# Patient Record
Sex: FEMALE | Race: WHITE | Employment: OTHER | URBAN - NONMETROPOLITAN AREA
[De-identification: names, ages, dates, MRNs, and addresses within clinical notes are randomized per-mention and may not be internally consistent; named-entity substitution may affect disease eponyms.]

---

## 2020-01-20 ENCOUNTER — OFFICE VISIT (OUTPATIENT)
Dept: PRIMARY CARE CLINIC | Age: 20
End: 2020-01-20
Payer: MEDICAID

## 2020-01-20 VITALS
HEIGHT: 64 IN | DIASTOLIC BLOOD PRESSURE: 80 MMHG | WEIGHT: 192 LBS | BODY MASS INDEX: 32.78 KG/M2 | OXYGEN SATURATION: 98 % | SYSTOLIC BLOOD PRESSURE: 110 MMHG | TEMPERATURE: 97.2 F | HEART RATE: 91 BPM | RESPIRATION RATE: 18 BRPM

## 2020-01-20 PROCEDURE — 99203 OFFICE O/P NEW LOW 30 MIN: CPT | Performed by: FAMILY MEDICINE

## 2020-01-20 PROCEDURE — G8417 CALC BMI ABV UP PARAM F/U: HCPCS | Performed by: FAMILY MEDICINE

## 2020-01-20 PROCEDURE — G8427 DOCREV CUR MEDS BY ELIG CLIN: HCPCS | Performed by: FAMILY MEDICINE

## 2020-01-20 PROCEDURE — G8484 FLU IMMUNIZE NO ADMIN: HCPCS | Performed by: FAMILY MEDICINE

## 2020-01-20 PROCEDURE — 4004F PT TOBACCO SCREEN RCVD TLK: CPT | Performed by: FAMILY MEDICINE

## 2020-01-20 RX ORDER — LANOLIN ALCOHOL/MO/W.PET/CERES
3 CREAM (GRAM) TOPICAL NIGHTLY PRN
Qty: 30 TABLET | Refills: 3 | Status: SHIPPED | OUTPATIENT
Start: 2020-01-20 | End: 2020-08-18

## 2020-01-20 RX ORDER — ESCITALOPRAM OXALATE 10 MG/1
10 TABLET ORAL DAILY
Qty: 30 TABLET | Refills: 3 | Status: SHIPPED | OUTPATIENT
Start: 2020-01-20 | End: 2020-02-17 | Stop reason: SDUPTHER

## 2020-01-20 RX ORDER — NORELGESTROMIN AND ETHINYL ESTRADIOL 150; 35 UG/D; UG/D
PATCH TRANSDERMAL
COMMUNITY
Start: 2020-01-16 | End: 2021-07-30

## 2020-01-20 SDOH — HEALTH STABILITY: MENTAL HEALTH: HOW OFTEN DO YOU HAVE A DRINK CONTAINING ALCOHOL?: 2-4 TIMES A MONTH

## 2020-01-20 SDOH — HEALTH STABILITY: MENTAL HEALTH: HOW MANY STANDARD DRINKS CONTAINING ALCOHOL DO YOU HAVE ON A TYPICAL DAY?: 1 OR 2

## 2020-01-20 ASSESSMENT — PATIENT HEALTH QUESTIONNAIRE - PHQ9
7. TROUBLE CONCENTRATING ON THINGS, SUCH AS READING THE NEWSPAPER OR WATCHING TELEVISION: 1
4. FEELING TIRED OR HAVING LITTLE ENERGY: 3
2. FEELING DOWN, DEPRESSED OR HOPELESS: 3
3. TROUBLE FALLING OR STAYING ASLEEP: 3
1. LITTLE INTEREST OR PLEASURE IN DOING THINGS: 1
SUM OF ALL RESPONSES TO PHQ QUESTIONS 1-9: 15
8. MOVING OR SPEAKING SO SLOWLY THAT OTHER PEOPLE COULD HAVE NOTICED. OR THE OPPOSITE, BEING SO FIGETY OR RESTLESS THAT YOU HAVE BEEN MOVING AROUND A LOT MORE THAN USUAL: 0
10. IF YOU CHECKED OFF ANY PROBLEMS, HOW DIFFICULT HAVE THESE PROBLEMS MADE IT FOR YOU TO DO YOUR WORK, TAKE CARE OF THINGS AT HOME, OR GET ALONG WITH OTHER PEOPLE: 2
SUM OF ALL RESPONSES TO PHQ QUESTIONS 1-9: 15
5. POOR APPETITE OR OVEREATING: 3
9. THOUGHTS THAT YOU WOULD BE BETTER OFF DEAD, OR OF HURTING YOURSELF: 0
6. FEELING BAD ABOUT YOURSELF - OR THAT YOU ARE A FAILURE OR HAVE LET YOURSELF OR YOUR FAMILY DOWN: 1
SUM OF ALL RESPONSES TO PHQ9 QUESTIONS 1 & 2: 4

## 2020-01-24 ASSESSMENT — ENCOUNTER SYMPTOMS
COLOR CHANGE: 0
WHEEZING: 0
VOMITING: 0
BACK PAIN: 0
NAUSEA: 0
DIARRHEA: 0
COUGH: 0
ABDOMINAL PAIN: 0
EYE DISCHARGE: 0

## 2020-02-17 ENCOUNTER — OFFICE VISIT (OUTPATIENT)
Dept: PRIMARY CARE CLINIC | Age: 20
End: 2020-02-17
Payer: MEDICAID

## 2020-02-17 VITALS
HEART RATE: 107 BPM | DIASTOLIC BLOOD PRESSURE: 80 MMHG | TEMPERATURE: 97.3 F | BODY MASS INDEX: 32.44 KG/M2 | RESPIRATION RATE: 20 BRPM | OXYGEN SATURATION: 98 % | WEIGHT: 190 LBS | HEIGHT: 64 IN | SYSTOLIC BLOOD PRESSURE: 124 MMHG

## 2020-02-17 PROCEDURE — G8484 FLU IMMUNIZE NO ADMIN: HCPCS | Performed by: FAMILY MEDICINE

## 2020-02-17 PROCEDURE — G8427 DOCREV CUR MEDS BY ELIG CLIN: HCPCS | Performed by: FAMILY MEDICINE

## 2020-02-17 PROCEDURE — G8417 CALC BMI ABV UP PARAM F/U: HCPCS | Performed by: FAMILY MEDICINE

## 2020-02-17 PROCEDURE — 99213 OFFICE O/P EST LOW 20 MIN: CPT | Performed by: FAMILY MEDICINE

## 2020-02-17 PROCEDURE — 4004F PT TOBACCO SCREEN RCVD TLK: CPT | Performed by: FAMILY MEDICINE

## 2020-02-17 RX ORDER — ONDANSETRON 4 MG/1
4 TABLET, ORALLY DISINTEGRATING ORAL 3 TIMES DAILY PRN
Qty: 21 TABLET | Refills: 0 | Status: SHIPPED | OUTPATIENT
Start: 2020-02-17 | End: 2020-09-22

## 2020-02-17 RX ORDER — PROMETHAZINE HYDROCHLORIDE 25 MG/1
25 TABLET ORAL 4 TIMES DAILY PRN
Qty: 20 TABLET | Refills: 0 | Status: SHIPPED | OUTPATIENT
Start: 2020-02-17 | End: 2020-02-24

## 2020-02-17 RX ORDER — ESCITALOPRAM OXALATE 20 MG/1
20 TABLET ORAL DAILY
Qty: 30 TABLET | Refills: 5 | Status: SHIPPED | OUTPATIENT
Start: 2020-02-17 | End: 2020-08-18

## 2020-02-17 RX ORDER — SCOLOPAMINE TRANSDERMAL SYSTEM 1 MG/1
1 PATCH, EXTENDED RELEASE TRANSDERMAL
Qty: 10 PATCH | Refills: 11 | Status: SHIPPED | OUTPATIENT
Start: 2020-02-17 | End: 2020-08-18

## 2020-02-18 ASSESSMENT — ENCOUNTER SYMPTOMS
EYE DISCHARGE: 0
VOMITING: 0
NAUSEA: 0
COUGH: 0
ABDOMINAL PAIN: 0
COLOR CHANGE: 0
BACK PAIN: 0
WHEEZING: 0
DIARRHEA: 0

## 2020-08-18 ENCOUNTER — OFFICE VISIT (OUTPATIENT)
Dept: PRIMARY CARE CLINIC | Age: 20
End: 2020-08-18
Payer: MEDICAID

## 2020-08-18 VITALS
SYSTOLIC BLOOD PRESSURE: 120 MMHG | RESPIRATION RATE: 16 BRPM | TEMPERATURE: 97.2 F | HEIGHT: 63 IN | WEIGHT: 174.6 LBS | BODY MASS INDEX: 30.94 KG/M2 | DIASTOLIC BLOOD PRESSURE: 78 MMHG | OXYGEN SATURATION: 98 % | HEART RATE: 95 BPM

## 2020-08-18 LAB
ALBUMIN SERPL-MCNC: 4.3 G/DL (ref 3.5–5.2)
ALP BLD-CCNC: 62 U/L (ref 35–104)
ALT SERPL-CCNC: 15 U/L (ref 5–33)
ANION GAP SERPL CALCULATED.3IONS-SCNC: 18 MMOL/L (ref 7–19)
AST SERPL-CCNC: 14 U/L (ref 5–32)
BASOPHILS ABSOLUTE: 0.1 K/UL (ref 0–0.2)
BASOPHILS RELATIVE PERCENT: 0.9 % (ref 0–1)
BILIRUB SERPL-MCNC: 0.3 MG/DL (ref 0.2–1.2)
BUN BLDV-MCNC: 11 MG/DL (ref 6–20)
CALCIUM SERPL-MCNC: 9.6 MG/DL (ref 8.6–10)
CHLORIDE BLD-SCNC: 103 MMOL/L (ref 98–111)
CO2: 19 MMOL/L (ref 22–29)
CREAT SERPL-MCNC: 0.9 MG/DL (ref 0.5–0.9)
EOSINOPHILS ABSOLUTE: 0.1 K/UL (ref 0–0.6)
EOSINOPHILS RELATIVE PERCENT: 2 % (ref 0–5)
GFR AFRICAN AMERICAN: >59
GFR NON-AFRICAN AMERICAN: >60
GLUCOSE BLD-MCNC: 92 MG/DL (ref 74–109)
HCT VFR BLD CALC: 43.1 % (ref 37–47)
HEMOGLOBIN: 14.2 G/DL (ref 12–16)
IMMATURE GRANULOCYTES #: 0 K/UL
LYMPHOCYTES ABSOLUTE: 1.5 K/UL (ref 1.1–4.5)
LYMPHOCYTES RELATIVE PERCENT: 21.1 % (ref 20–40)
MCH RBC QN AUTO: 28 PG (ref 27–31)
MCHC RBC AUTO-ENTMCNC: 32.9 G/DL (ref 33–37)
MCV RBC AUTO: 84.8 FL (ref 81–99)
MONOCYTES ABSOLUTE: 0.5 K/UL (ref 0–0.9)
MONOCYTES RELATIVE PERCENT: 6.6 % (ref 0–10)
NEUTROPHILS ABSOLUTE: 4.8 K/UL (ref 1.5–7.5)
NEUTROPHILS RELATIVE PERCENT: 69.1 % (ref 50–65)
PDW BLD-RTO: 12.9 % (ref 11.5–14.5)
PLATELET # BLD: 267 K/UL (ref 130–400)
PMV BLD AUTO: 11.1 FL (ref 9.4–12.3)
POTASSIUM SERPL-SCNC: 4.2 MMOL/L (ref 3.5–5)
RBC # BLD: 5.08 M/UL (ref 4.2–5.4)
SODIUM BLD-SCNC: 140 MMOL/L (ref 136–145)
TOTAL PROTEIN: 7.2 G/DL (ref 6.6–8.7)
TSH SERPL DL<=0.05 MIU/L-ACNC: 1.45 UIU/ML (ref 0.27–4.2)
WBC # BLD: 6.9 K/UL (ref 4.8–10.8)

## 2020-08-18 PROCEDURE — G8427 DOCREV CUR MEDS BY ELIG CLIN: HCPCS | Performed by: FAMILY MEDICINE

## 2020-08-18 PROCEDURE — 4004F PT TOBACCO SCREEN RCVD TLK: CPT | Performed by: FAMILY MEDICINE

## 2020-08-18 PROCEDURE — 93000 ELECTROCARDIOGRAM COMPLETE: CPT | Performed by: FAMILY MEDICINE

## 2020-08-18 PROCEDURE — G8417 CALC BMI ABV UP PARAM F/U: HCPCS | Performed by: FAMILY MEDICINE

## 2020-08-18 PROCEDURE — 99214 OFFICE O/P EST MOD 30 MIN: CPT | Performed by: FAMILY MEDICINE

## 2020-08-18 PROCEDURE — 36415 COLL VENOUS BLD VENIPUNCTURE: CPT | Performed by: FAMILY MEDICINE

## 2020-08-18 RX ORDER — BUSPIRONE HYDROCHLORIDE 5 MG/1
5 TABLET ORAL 3 TIMES DAILY
Qty: 90 TABLET | Refills: 0 | Status: SHIPPED | OUTPATIENT
Start: 2020-08-18 | End: 2020-09-22 | Stop reason: SDUPTHER

## 2020-08-18 RX ORDER — BUPROPION HYDROCHLORIDE 150 MG/1
150 TABLET ORAL EVERY MORNING
Qty: 30 TABLET | Refills: 3 | Status: SHIPPED | OUTPATIENT
Start: 2020-08-18 | End: 2020-09-22 | Stop reason: SDUPTHER

## 2020-08-18 NOTE — PROGRESS NOTES
SUBJECTIVE:    Patient ID: [place] is a 23 y.o. female. HPI:   Patient is here today for complaints of problems with being lightheaded, headache, vomiting, chest pain - left side of chest pain, no triggers. HR has gotten to 40 and then highest is 195. No family history of heart problems. Feels different than a panic attack. She states that her heart rate fluctuates a lot. She states that her apple watch will go off and she states it occasionally will run in the 190s and then will drop down in the 40s. She states that she does not really notice any specific triggers for this. She states that she does notice that it drops down a lot when she is resting. She is feeling palpitations when this happens. She states that it does cause some dizziness as well. She states her anxiety is much worse. She states that she is also having some panic attacks. She states that she has stopped taking her Lexapro because she actually felt like this was making her worse. She denies any suicidal thoughts or ideation. She is having difficulty sleeping. She states that she does have the panic attacks a lot at night. Past Medical History:   Diagnosis Date    Anxiety     Depression     PTSD (post-traumatic stress disorder)       Current Outpatient Medications   Medication Sig Dispense Refill    buPROPion (WELLBUTRIN XL) 150 MG extended release tablet Take 1 tablet by mouth every morning 30 tablet 3    busPIRone (BUSPAR) 5 MG tablet Take 1 tablet by mouth 3 times daily 90 tablet 0    ondansetron (ZOFRAN-ODT) 4 MG disintegrating tablet Take 1 tablet by mouth 3 times daily as needed for Nausea or Vomiting 21 tablet 0    XULANE 150-35 MCG/24HR APPLY 1 PATCH TOPICALLY ONCE A WEEK       No current facility-administered medications for this visit. Allergies   Allergen Reactions    Penicillins Hives       Review of Systems   Constitutional: Positive for fatigue.  Negative for activity change, appetite

## 2020-08-20 ASSESSMENT — ENCOUNTER SYMPTOMS
COUGH: 0
EYE DISCHARGE: 0
DIARRHEA: 0
BACK PAIN: 0
ABDOMINAL PAIN: 0
CHEST TIGHTNESS: 1
NAUSEA: 1
WHEEZING: 0
COLOR CHANGE: 0
VOMITING: 0

## 2020-08-21 ENCOUNTER — HOSPITAL ENCOUNTER (OUTPATIENT)
Dept: NON INVASIVE DIAGNOSTICS | Age: 20
Discharge: HOME | End: 2020-08-21
Payer: MEDICAID

## 2020-08-21 PROCEDURE — 93229 REMOTE 30 DAY ECG TECH SUPP: CPT

## 2020-09-15 NOTE — PROCEDURES
Cardiac event monitor report    Dates of recording 8/21/2020 through 8/29/2020    Summary impressions:    1. Sinus rhythm minimal heart rate 51 average rate 86 maximal rate 174      2. No episodes of ventricular tachycardia were recorded    3. No pauses greater than 3.0 seconds were recorded    4. No episodes of complete or Mobitz 2 atrioventricular block were recorded    5. No episodes of atrial fibrillation were recorded    6. No episodes of supraventricular tachycardia were recorded    7. 5 triggered events 0 diary events sinus rhythm during those recordings    8.  Rare ectopy otherwise noted

## 2020-09-22 ENCOUNTER — OFFICE VISIT (OUTPATIENT)
Dept: PRIMARY CARE CLINIC | Age: 20
End: 2020-09-22
Payer: MEDICAID

## 2020-09-22 VITALS
HEIGHT: 63 IN | DIASTOLIC BLOOD PRESSURE: 74 MMHG | BODY MASS INDEX: 30.09 KG/M2 | SYSTOLIC BLOOD PRESSURE: 121 MMHG | OXYGEN SATURATION: 98 % | WEIGHT: 169.8 LBS | HEART RATE: 98 BPM | RESPIRATION RATE: 18 BRPM | TEMPERATURE: 97.9 F

## 2020-09-22 PROCEDURE — G8417 CALC BMI ABV UP PARAM F/U: HCPCS | Performed by: FAMILY MEDICINE

## 2020-09-22 PROCEDURE — 99214 OFFICE O/P EST MOD 30 MIN: CPT | Performed by: FAMILY MEDICINE

## 2020-09-22 PROCEDURE — G8427 DOCREV CUR MEDS BY ELIG CLIN: HCPCS | Performed by: FAMILY MEDICINE

## 2020-09-22 PROCEDURE — 4004F PT TOBACCO SCREEN RCVD TLK: CPT | Performed by: FAMILY MEDICINE

## 2020-09-22 RX ORDER — BUSPIRONE HYDROCHLORIDE 10 MG/1
10 TABLET ORAL 3 TIMES DAILY PRN
Qty: 90 TABLET | Refills: 3 | Status: SHIPPED | OUTPATIENT
Start: 2020-09-22 | End: 2021-07-30

## 2020-09-22 RX ORDER — BUPROPION HYDROCHLORIDE 300 MG/1
300 TABLET ORAL EVERY MORNING
Qty: 30 TABLET | Refills: 5 | Status: SHIPPED | OUTPATIENT
Start: 2020-09-22 | End: 2021-07-30

## 2020-09-22 NOTE — PATIENT INSTRUCTIONS
Patient Education        Hip Flexor Strain: Rehab Exercises  Introduction  Here are some examples of exercises for you to try. The exercises may be suggested for a condition or for rehabilitation. Start each exercise slowly. Ease off the exercises if you start to have pain. You will be told when to start these exercises and which ones will work best for you. How to do the exercises  Pelvic tilt with marching   1. Lie on your back with your knees bent and your feet flat on the floor. 2. Tighten your belly muscles and buttocks, and press your lower back to the floor. 3. Keeping your knees bent, lift and then lower one leg up off the floor, and then lift and lower your other leg like you are marching. Each time you lift your leg, hold that position for about 6 seconds before lowering your leg. 4. Repeat 8 to 12 times. Scissors   1. Lie on your back with your knees bent at a 90-degree angle and your feet off the floor. 2. Tighten your belly muscles and buttocks, and press your lower back to the floor. 3. Slowly straighten one leg, and hold that position for about 6 seconds. Your leg should be about 12 inches off the floor. Bring that leg back to the starting position, and then straighten your other leg. Hold that position for about 6 seconds, and then switch legs again. 4. Repeat 8 to 12 times. Hamstring stretch (lying down)   1. Lie flat on your back with your legs straight. If you feel discomfort in your back, place a small towel roll under your lower back. 2. Holding the back of your affected leg for support, lift your leg straight up and toward your body until you feel a stretch at the back of your thigh. 3. Hold the stretch for at least 30 seconds. 4. Repeat 2 to 4 times. Quadricep and hip flexor stretch (lying on side)   1. Lie on your side with your good leg flat on the floor and your hand supporting your head.   2. Bend your top leg, and reach behind you to grab the front of that foot or ankle with your other hand. 3. Stretch your leg back by pulling your foot toward your buttock. You will feel the stretch in the front of your thigh. If this causes stress on your knee, do not do this stretch. 4. Hold the stretch for at least 15 to 30 seconds. 5. Repeat 2 to 4 times. Hip flexor stretch (kneeling)   1. Kneel on your affected leg and bend your good leg out in front of you, with that foot flat on the floor. If you feel discomfort in the front of your knee, place a towel under your knee. 2. Keeping your back straight, slowly push your hips forward until you feel a stretch in the upper thigh of your back leg and hip. 3. Hold the stretch for at least 15 to 30 seconds. 4. Repeat 2 to 4 times. Hip flexor stretch (edge of table)   1. Lie flat on your back on a table or flat bench, with your knees and lower legs hanging off the edge of the table. 2. Grab your good leg at the knee, and pull that knee back toward your chest. Relax your affected leg and let it hang down toward the floor until you feel a stretch in the upper thigh of your affected leg and hip. 3. Hold the stretch for at least 15 to 30 seconds. 4. Repeat 2 to 4 times. Follow-up care is a key part of your treatment and safety. Be sure to make and go to all appointments, and call your doctor if you are having problems. It's also a good idea to know your test results and keep a list of the medicines you take. Where can you learn more? Go to https://[email or web address]. org and sign in to your [employer] account. Enter L439 in the [employer] box to learn more about \"Hip Flexor Strain: Rehab Exercises. \"     If you do not have an account, please click on the \"Sign Up Now\" link. Current as of: March 2, 2020               Content Version: 12.5  © 2006-2020 [employer]. Care instructions adapted under license by [facility] [place] ([facility]).  If you have questions about a medical condition or this instruction, always ask your healthcare professional. [place] [address] any warranty or liability for your use of this information.

## 2020-09-24 ASSESSMENT — ENCOUNTER SYMPTOMS
EYE DISCHARGE: 0
ABDOMINAL PAIN: 0
COLOR CHANGE: 0
NAUSEA: 0
VOMITING: 0
COUGH: 0
DIARRHEA: 0
WHEEZING: 0
BACK PAIN: 0

## 2020-09-24 NOTE — PROGRESS NOTES
Negative for cough and wheezing. Cardiovascular: Negative for chest pain and leg swelling. Gastrointestinal: Negative for abdominal pain, diarrhea, nausea and vomiting. Genitourinary: Negative for difficulty urinating, frequency and urgency. Musculoskeletal: Positive for myalgias. Negative for arthralgias, back pain and gait problem. Skin: Negative for color change and rash. Neurological: Negative for dizziness and headaches. Hematological: Does not bruise/bleed easily. Psychiatric/Behavioral: Positive for dysphoric mood. Negative for sleep disturbance and suicidal ideas. The patient is nervous/anxious. OBJECTIVE:     Physical Exam  Vitals signs reviewed. Constitutional:       General: She is not in acute distress. Appearance: Normal appearance. She is well-developed. She is not diaphoretic. HENT:      Head: Normocephalic and atraumatic. Right Ear: External ear normal.      Left Ear: External ear normal.   Neck:      Musculoskeletal: Normal range of motion and neck supple. Cardiovascular:      Rate and Rhythm: Normal rate and regular rhythm. Pulses: Normal pulses. Heart sounds: Normal heart sounds. No murmur. Pulmonary:      Effort: Pulmonary effort is normal. No respiratory distress. Breath sounds: Normal breath sounds. Skin:     General: Skin is warm and dry. Neurological:      General: No focal deficit present. Mental Status: She is alert and oriented to person, place, and time. Mental status is at baseline. Psychiatric:         Mood and Affect: Mood normal.         Behavior: Behavior normal.         Thought Content: Thought content normal.         Judgment: Judgment normal.        /74   Pulse 98   Temp 97.9 °F (36.6 °C)   Resp 18   Ht 5' 3\" (1.6 m)   Wt 169 lb 12.8 oz (77 kg)   SpO2 98%   BMI 30.08 kg/m²      ASSESSMENT:    Marie Coyne was seen today for 1 month follow-up.     Diagnoses and all orders for this visit:    Anxiety    Moderate

## 2021-07-30 ENCOUNTER — TELEPHONE (OUTPATIENT)
Dept: PRIMARY CARE CLINIC | Age: 21
End: 2021-07-30

## 2021-07-30 ENCOUNTER — OFFICE VISIT (OUTPATIENT)
Dept: PRIMARY CARE CLINIC | Age: 21
End: 2021-07-30
Payer: MEDICAID

## 2021-07-30 VITALS
SYSTOLIC BLOOD PRESSURE: 124 MMHG | BODY MASS INDEX: 33.59 KG/M2 | WEIGHT: 189.6 LBS | HEIGHT: 63 IN | OXYGEN SATURATION: 98 % | TEMPERATURE: 97.8 F | HEART RATE: 105 BPM | RESPIRATION RATE: 18 BRPM | DIASTOLIC BLOOD PRESSURE: 80 MMHG

## 2021-07-30 DIAGNOSIS — R21 RASH: Primary | ICD-10-CM

## 2021-07-30 DIAGNOSIS — F32.A ANXIETY AND DEPRESSION: ICD-10-CM

## 2021-07-30 DIAGNOSIS — F41.9 ANXIETY AND DEPRESSION: ICD-10-CM

## 2021-07-30 PROCEDURE — 99214 OFFICE O/P EST MOD 30 MIN: CPT | Performed by: NURSE PRACTITIONER

## 2021-07-30 PROCEDURE — G8427 DOCREV CUR MEDS BY ELIG CLIN: HCPCS | Performed by: NURSE PRACTITIONER

## 2021-07-30 PROCEDURE — 4004F PT TOBACCO SCREEN RCVD TLK: CPT | Performed by: NURSE PRACTITIONER

## 2021-07-30 PROCEDURE — G8417 CALC BMI ABV UP PARAM F/U: HCPCS | Performed by: NURSE PRACTITIONER

## 2021-07-30 RX ORDER — DIAPER,BRIEF,INFANT-TODD,DISP
EACH MISCELLANEOUS
Qty: 1 TUBE | Refills: 1 | Status: SHIPPED | OUTPATIENT
Start: 2021-07-30 | End: 2021-07-30 | Stop reason: SDUPTHER

## 2021-07-30 RX ORDER — DIAPER,BRIEF,INFANT-TODD,DISP
EACH MISCELLANEOUS
Qty: 1 TUBE | Refills: 1 | Status: SHIPPED | OUTPATIENT
Start: 2021-07-30 | End: 2021-08-06

## 2021-07-30 RX ORDER — HYDROXYZINE HYDROCHLORIDE 25 MG/1
25 TABLET, FILM COATED ORAL EVERY 8 HOURS PRN
Qty: 120 TABLET | Refills: 1 | Status: SHIPPED | OUTPATIENT
Start: 2021-07-30 | End: 2021-08-09

## 2021-07-30 RX ORDER — ERYTHROMYCIN AND BENZOYL PEROXIDE 30; 50 MG/G; MG/G
GEL TOPICAL
Qty: 46.6 G | Refills: 2 | Status: SHIPPED | OUTPATIENT
Start: 2021-07-30 | End: 2021-07-30 | Stop reason: SDUPTHER

## 2021-07-30 RX ORDER — ERYTHROMYCIN AND BENZOYL PEROXIDE 30; 50 MG/G; MG/G
GEL TOPICAL
Qty: 46.6 G | Refills: 2 | Status: SHIPPED | OUTPATIENT
Start: 2021-07-30 | End: 2021-08-29

## 2021-07-30 RX ORDER — CLOBETASOL PROPIONATE 0.5 MG/G
OINTMENT TOPICAL
Qty: 1 TUBE | Refills: 1 | Status: SHIPPED | OUTPATIENT
Start: 2021-07-30 | End: 2021-10-11

## 2021-07-30 RX ORDER — CLOBETASOL PROPIONATE 0.5 MG/G
OINTMENT TOPICAL
Qty: 1 TUBE | Refills: 1 | Status: SHIPPED | OUTPATIENT
Start: 2021-07-30 | End: 2021-07-30 | Stop reason: SDUPTHER

## 2021-07-30 ASSESSMENT — PATIENT HEALTH QUESTIONNAIRE - PHQ9
5. POOR APPETITE OR OVEREATING: 3
SUM OF ALL RESPONSES TO PHQ9 QUESTIONS 1 & 2: 4
3. TROUBLE FALLING OR STAYING ASLEEP: 3
7. TROUBLE CONCENTRATING ON THINGS, SUCH AS READING THE NEWSPAPER OR WATCHING TELEVISION: 0
10. IF YOU CHECKED OFF ANY PROBLEMS, HOW DIFFICULT HAVE THESE PROBLEMS MADE IT FOR YOU TO DO YOUR WORK, TAKE CARE OF THINGS AT HOME, OR GET ALONG WITH OTHER PEOPLE: 3
SUM OF ALL RESPONSES TO PHQ QUESTIONS 1-9: 19
6. FEELING BAD ABOUT YOURSELF - OR THAT YOU ARE A FAILURE OR HAVE LET YOURSELF OR YOUR FAMILY DOWN: 3
9. THOUGHTS THAT YOU WOULD BE BETTER OFF DEAD, OR OF HURTING YOURSELF: 0
SUM OF ALL RESPONSES TO PHQ QUESTIONS 1-9: 19
1. LITTLE INTEREST OR PLEASURE IN DOING THINGS: 2
2. FEELING DOWN, DEPRESSED OR HOPELESS: 2
8. MOVING OR SPEAKING SO SLOWLY THAT OTHER PEOPLE COULD HAVE NOTICED. OR THE OPPOSITE, BEING SO FIGETY OR RESTLESS THAT YOU HAVE BEEN MOVING AROUND A LOT MORE THAN USUAL: 3
4. FEELING TIRED OR HAVING LITTLE ENERGY: 3
SUM OF ALL RESPONSES TO PHQ QUESTIONS 1-9: 19

## 2021-07-30 ASSESSMENT — ENCOUNTER SYMPTOMS
RESPIRATORY NEGATIVE: 1
ALLERGIC/IMMUNOLOGIC NEGATIVE: 1
EYES NEGATIVE: 1
GASTROINTESTINAL NEGATIVE: 1

## 2021-07-30 ASSESSMENT — COLUMBIA-SUICIDE SEVERITY RATING SCALE - C-SSRS
6. HAVE YOU EVER DONE ANYTHING, STARTED TO DO ANYTHING, OR PREPARED TO DO ANYTHING TO END YOUR LIFE?: NO
2. HAVE YOU ACTUALLY HAD ANY THOUGHTS OF KILLING YOURSELF?: NO
1. WITHIN THE PAST MONTH, HAVE YOU WISHED YOU WERE DEAD OR WISHED YOU COULD GO TO SLEEP AND NOT WAKE UP?: NO

## 2021-07-30 NOTE — PATIENT INSTRUCTIONS
Patient Education        benzoyl peroxide topical  Pronunciation:  GREG zoyl per OX rosa isela  Brand:  Acne-Clear, Benzac AC, BenzePrO, Benziq, Brevoxyl Acne Wash Kit, Clearskin, Fostex Wash 10%, NeoBenz Micro, Neutrogena Acne Mask, Oscion, Oxy Daily Wash, Oxy-10, Pacnex, PanOxyl, Persa-Gel, Riax, SoluCLENZ Rx, Jesus Manuel Wheatley  What is the most important information I should know about benzoyl peroxide topical?  Benzoyl peroxide can cause a rare but serious allergic reaction or severe skin irritation. Stop using this medicine and get emergency medical help if you have: hives, itching; difficult breathing, feeling light-headed; or swelling of your face, lips, tongue, or throat. What is benzoyl peroxide topical?  Benzoyl peroxide has an antibacterial effect. It also has a mild drying effect, which allows excess oils and dirt to be easily washed away from the skin. Benzoyl peroxide topical (for the skin) is used to treat acne. There are many brands and forms of benzoyl peroxide available. Not all brands are listed on this leaflet. Benzoyl peroxide topical may also be used for purposes not listed in this medication guide. What should I discuss with my healthcare provider before using benzoyl peroxide topical?  You should not use benzoyl peroxide if you are allergic to it, or if you have:  · very sensitive skin. Ask a doctor or pharmacist if this medicine is safe to use if you have any skin conditions or allergies. Ask a doctor before using this medicine if you are pregnant or breastfeeding. Do not use this medicine on a child without medical advice. How should I use benzoyl peroxide topical?  Benzoyl peroxide topical can cause a rare but serious allergic reaction or severe skin irritation. Before you start using this medicine, you may choose to apply a \"test dose\" to see if you have a reaction. Apply a very small amount of the medicine to 1 or 2 small acne areas every day for 3 days in a row.  If there is no reaction, begin the possible side effects of benzoyl peroxide topical?  Benzoyl peroxide topical can cause a rare but serious allergic reaction or severe skin irritation. These reactions may occur just a few minutes after you apply the medicine, or within a day or longer afterward. Stop using this medicine and get emergency medical help if you have signs of an allergic reaction: hives, itching; difficult breathing, feeling light-headed; swelling of your face, lips, tongue, or throat. Stop using benzoyl peroxide and call your doctor at once if you have any of these side effects on the treated skin:  · severe itching or burning;  · severe stinging or redness;  · swelling; or  · peeling. Common side effects may include:  · mild stinging or burning;  · itching or tingly feeling;  · skin dryness, peeling, or flaking; or  · redness or other irritation. This is not a complete list of side effects and others may occur. Call your doctor for medical advice about side effects. You may report side effects to FDA at 1-048-FDA-1366. What other drugs will affect benzoyl peroxide topical?  Applying benzoyl peroxide while you are also using tretinoin topical medicine may cause severe skin irritation. Brands that contain tretinoin include Avita, Renova, Retin-A, Tretin-X, and others. Medicine used on the skin is not likely to be affected by other drugs you use. But many drugs can interact with each other. Tell each of your health care providers about all medicines you use, including prescription and over-the-counter medicines, vitamins, and herbal products. Where can I get more information? Your pharmacist can provide more information about benzoyl peroxide topical.  Remember, keep this and all other medicines out of the reach of children, never share your medicines with others, and use this medication only for the indication prescribed.    Every effort has been made to ensure that the information provided by [address] is accurate, up-to-date, and complete, but no guarantee is made to that effect. Drug information contained herein may be time sensitive. [employer] information has been compiled for use by healthcare practitioners and consumers in the [place] and therefore [employer] does not warrant that uses outside of the [place] are appropriate, unless specifically indicated otherwise. [facility] drug information does not endorse drugs, diagnose patients or recommend therapy. [facility] drug information is an informational resource designed to assist licensed healthcare practitioners in caring for their patients and/or to serve consumers viewing this service as a supplement to, and not a substitute for, the expertise, skill, knowledge and judgment of healthcare practitioners. The absence of a warning for a given drug or drug combination in no way should be construed to indicate that the drug or drug combination is safe, effective or appropriate for any given patient. [facility] does not assume any responsibility for any aspect of healthcare administered with the aid of information [employer] provides. The information contained herein is not intended to cover all possible uses, directions, precautions, warnings, drug interactions, allergic reactions, or adverse effects. If you have questions about the drugs you are taking, check with your doctor, nurse or pharmacist.  Copyright 3514-9926 [address]: 10.03. Revision date: 3/15/2020. Care instructions adapted under license by [facility] ([facility]). If you have questions about a medical condition or this instruction, always ask your healthcare professional. [place] [address] any warranty or liability for your use of this information. Patient Education        bacitracin topical  Pronunciation:  BAS i TRAY sin TOP i peggy  Brand:  Baciguent  What is the most important information I should know about bacitracin?   Follow all directions on your medicine label and package. Tell each of your healthcare providers about all your medical conditions, allergies, and all medicines you use. What is bacitracin? Bacitracin is an antibiotic that fights bacteria. Bacitracin topical (for the skin) is used to prevent infection in minor cuts, scrapes, and burns. Bacitracin may also be used for purposes not listed in this medication guide. What should I discuss with my health care provider before using bacitracin? You should not use this medicine if you are allergic to bacitracin, mineral oil, or petroleum jelly. Do not use bacitracin topical to treat animal bites, puncture wounds, deep skin wounds, or severe burns. Ask a doctor or pharmacist if it is safe for you to use this medicine if you are not sure. Bacitracin is not expected to harm an unborn baby. It is not known whether bacitracin passes into breast milk or if it could harm a nursing baby. Ask a doctor or pharmacist if it is safe for you to use this medicine if you are pregnant or breast-feeding. How should I use bacitracin? Use exactly as directed on the label, or as prescribed by your doctor. Do not use in larger or smaller amounts or for longer than recommended. Clean the skin area before applying bacitracin topical.  You may apply bacitracin to the affected area 1 to 3 times per day. Apply only enough to cover the area you are treating. Do not use this medication over large areas of skin. You may cover the treated skin with a bandage. Do not use bacitracin for longer than 7 days in a row. Call your doctor if your symptoms do not improve after 7 days of treatment, or if you develop a skin rash. Store at room temperature away from moisture and heat. Do not freeze. What happens if I miss a dose? Since bacitracin topical is when needed, you are not likely to miss a dose. What happens if I overdose? Seek emergency medical attention or call the Poison Help line at 1-547.500.2307.   What should I avoid while using bacitracin? Bacitracin topical is for use only on the skin. Avoid getting this medication in your eyes, nose, mouth, rectum, or vagina. If this does happen, rinse with water. What are the possible side effects of bacitracin? Get emergency medical help if you have signs of an allergic reaction:  hives; difficult breathing; swelling of your face, lips, tongue, or throat. Less serious side effects are more likely, and you may have none at all. This is not a complete list of side effects and others may occur. Call your doctor for medical advice about side effects. You may report side effects to FDA at 2-750-CRZ-7323. What other drugs will affect bacitracin? It is not likely that other drugs you take orally or inject will have an effect on topically applied bacitracin. But many drugs can interact with each other. Tell each of your health care providers about all medicines you use, including prescription and over-the-counter medicines, vitamins, and herbal products. Where can I get more information? Your pharmacist can provide more information about bacitracin topical.  Remember, keep this and all other medicines out of the reach of children, never share your medicines with others, and use this medication only for the indication prescribed. Every effort has been made to ensure that the information provided by [address] is accurate, up-to-date, and complete, but no guarantee is made to that effect. Drug information contained herein may be time sensitive. [facility] information has been compiled for use by healthcare practitioners and consumers in the [place] and therefore [facility] does not warrant that uses outside of the [place] are appropriate, unless specifically indicated otherwise. [facility] drug information does not endorse drugs, diagnose patients or recommend therapy.  [facility] drug information is an informational resource designed to assist licensed healthcare practitioners in caring for their patients and/or to serve consumers viewing this service as a supplement to, and not a substitute for, the expertise, skill, knowledge and judgment of healthcare practitioners. The absence of a warning for a given drug or drug combination in no way should be construed to indicate that the drug or drug combination is safe, effective or appropriate for any given patient. [facility] does not assume any responsibility for any aspect of healthcare administered with the aid of information [facility] provides. The information contained herein is not intended to cover all possible uses, directions, precautions, warnings, drug interactions, allergic reactions, or adverse effects. If you have questions about the drugs you are taking, check with your doctor, nurse or pharmacist.  Copyright 4133-3879 [address] Version: 3.01. Revision date: 9/30/2015. Care instructions adapted under license by [facility] ([facility]). If you have questions about a medical condition or this instruction, always ask your healthcare professional. [place] [address] any warranty or liability for your use of this information. Patient Education        zinc oxide topical  Pronunciation:  LUKE natarajan  Brand:  ARC, Balmex, Radha Butt Paste, Caldesene, Calmol-4 Suppository, Critic-Aid Skin Paste, Dr. Noé Hunter Rash + Skin, [place] Buttocks Ointment, Isa-Protect, Pinxav, Rash Relief, Secura Protective Cream, Seniortopix Healix, Unna-Flex Elastic Unna Boot 4 inch, Z-Bum, Znlin  What is the most important information I should know about zinc oxide topical?  Follow all directions on your medicine label and package. Tell each of your healthcare providers about all your medical conditions, allergies, and all medicines you use.   What is zinc oxide topical?  Zinc oxide is a mineral.  Zinc oxide topical (for the skin) is used to treat diaper rash, minor burns, severely chapped skin, or other minor skin wrapper before inserting the suppository. Avoid handling the suppository too long or it will melt. Lie on your back with your knees up toward your chest. Gently insert the suppository into your rectum about 1 inch, pointed tip first.  Stay lying down for a few minutes while the suppository melts. You should feel little or no discomfort. Avoid using the bathroom for at least an hour. Call your doctor if your symptoms do not improve after 7 days of treatment. Store at room temperature away from moisture and heat. Keep the tube cap tightly closed when not in use. You may store zinc oxide rectal suppositories in a refrigerator to prevent melting. What happens if I miss a dose? Since this medicine is used when needed, you may not be on a dosing schedule. Skip any missed dose if it's almost time for your next dose. Do not use two doses at one time. What happens if I overdose? An overdose of zinc oxide is not expected to be dangerous. Seek emergency medical attention or call the Poison Help line at 1-436.160.3404 if anyone has accidentally swallowed the medication. What should I avoid while using zinc oxide topical?  Do not use zinc oxide topical on deep skin wounds or severe burns. Avoid using other medications on the areas you treat with zinc oxide unless your doctor tells you to. Rinse with water if this medicine gets in your eyes. What are the possible side effects of zinc oxide topical?  Get emergency medical help if you have signs of an allergic reaction: hives; difficult breathing; swelling of your face, lips, tongue, or throat. Stop using zinc oxide rectal suppositories and call your doctor if you have rectal bleeding or continued pain. This is not a complete list of side effects and others may occur. Call your doctor for medical advice about side effects. You may report side effects to FDA at 4-355-FDA-7317.   What other drugs will affect zinc oxide topical?  Medicine used on the skin is not likely to be affected by other drugs you use. But many drugs can interact with each other. Tell each of your healthcare providers about all medicines you use, including prescription and over-the-counter medicines, vitamins, and herbal products. Where can I get more information? Your pharmacist can provide more information about zinc oxide topical.  Remember, keep this and all other medicines out of the reach of children, never share your medicines with others, and use this medication only for the indication prescribed. Every effort has been made to ensure that the information provided by [address] is accurate, up-to-date, and complete, but no guarantee is made to that effect. Drug information contained herein may be time sensitive. [facility] information has been compiled for use by healthcare practitioners and consumers in the [facility] and therefore [facility] does not warrant that uses outside of the [facility] are appropriate, unless specifically indicated otherwise. [facility]'s drug information does not endorse drugs, diagnose patients or recommend therapy. [facility] drug information is an informational resource designed to assist licensed healthcare practitioners in caring for their patients and/or to serve consumers viewing this service as a supplement to, and not a substitute for, the expertise, skill, knowledge and judgment of healthcare practitioners. The absence of a warning for a given drug or drug combination in no way should be construed to indicate that the drug or drug combination is safe, effective or appropriate for any given patient. [facility] does not assume any responsibility for any aspect of healthcare administered with the aid of information [facility] provides. The information contained herein is not intended to cover all possible uses, directions, precautions, warnings, drug interactions, allergic reactions, or adverse effects.  If you have questions about the drugs you are taking, check with your doctor, nurse or pharmacist.  Copyright 9257-4579 [address]: 4.02. Revision date: 6/26/2019. Care instructions adapted under license by [facility] ([facility]). If you have questions about a medical condition or this instruction, always ask your healthcare professional. [employer] any warranty or liability for your use of this information. Patient Education        miconazole and zinc oxide topical  Pronunciation:  polly MEEHAN a zole and LUKE OX rosa isela TOP ik al  Brand:  Rash Relief Antifungal, Vusion  What is the most important information I should know about miconazole and zinc oxide topical?  This medicine should not be used to prevent diaper rash. What is miconazole and zinc oxide topical?  Miconazole and zinc oxide topical (for the skin) is a combination antifungal medicine that fights infections caused by fungus. The ointment form is used to treat diaper rash with yeast infection (candidiasis) in children and babies who are at least 2 weeks old. Miconazole and zinc oxide topical ointment is for use only on diaper rash that has been diagnosed by a doctor. The spray form is used to treat fungal infections of the skin, such as athlete's foot (tinea pedis), jock itch (tinea cruris), or ringworm (tinea corporis). Miconazole and zinc oxide topical spray is for use by adults and children who are at least 3years old. Miconazole and zinc oxide topical should not be used to prevent diaper rash in either children or incontinent adults. Miconazole and zinc oxide topical may also be used for purposes not listed in this medication guide. What should I discuss with my healthcare provider before using miconazole and zinc oxide topical?  You should not use this medicine if you are allergic to miconazole, zinc, dimethicone, mineral oil, petroleum, or lanolin. Do not use the ointment form of this medicine on a child younger than 2 weeks old.   Ask a doctor before using the topical spray on a child younger than 3years old. Tell your doctor if you have ever had:  · a weak immune system caused by disease or by using certain medicines. Ask a doctor before using this medicine if you are pregnant or breast-feeding. How should I use miconazole and zinc oxide topical?  Use exactly as directed on the label, or as prescribed by your doctor. Your doctor may perform lab tests to make sure you have the type of infection that this medicine can treat effectively. Read and carefully follow any Instructions for Use provided with your medicine. Ask your doctor or pharmacist if you do not understand these instructions. Clean and dry the affected area before applying this medicine. You may use a mild soap. Use the ointment  for 1 week, each time you change a diaper. This medicine will not be effective without frequent diaper changes. Change your child's diapers as soon as they become wet or soiled. Keep the diaper area clean and dry. Do not use miconazole and zinc oxide topical to prevent diaper rash or you may increase your child's risk of infection that is resistant to treatment. Miconazole and zinc oxide topical ointment is not for use on general diaper rash without a related yeast infection. Allow the spray  to dry completely before you dress. There is no need to rub in the medicine. Use this medicine for the full prescribed length of time, even if your symptoms quickly improve. Skipping doses can increase your risk of infection that is resistant to medication. Do not share this medicine with another person, even if they have the same symptoms you have. Stop using the medicine and call your doctor if symptoms do not improve, or if they get worse. Diaper rash should start to improve within 1 week of use. Jock itch should improve within 2 weeks, and athlete's foot should improve within 4 weeks.  For best results, use this medicine as directed and follow all instructions for keeping the treatment area clean and dry. Store at room temperature away from moisture and heat. Keep the tube or bottle tightly capped when not in use. What happens if I miss a dose? Since miconazole and zinc oxide topical ointment is used with each diaper change, you are not likely to miss a dose. If you are using the spray on a schedule, use the medicine as soon as you can. Skip the missed dose if it is almost time for your next dose. Do not use two doses at one time. What happens if I overdose? An overdose of miconazole and zinc oxide topical is not expected to be dangerous. Seek emergency medical attention or call the Poison Help line at 1-357.309.8176 if anyone has accidentally swallowed the medication. What should I avoid while using miconazole and zinc oxide topical?  Avoid covering treated skin areas with tight-fitting, synthetic clothing (such as nylon or polyester clothing, or plastic pants) that does not allow air to circulate to your skin. If you are treating your feet, wear clean cotton socks and sandals or shoes that allow for air circulation. Keep your feet as dry as possible. Rinse with water if this medicine gets in your eyes. Avoid using scented or perfumed soaps or lotions to clean the diaper area. Avoid getting this medication in the mouth or vagina. What are the possible side effects of miconazole and zinc oxide topical?  Get emergency medical help if you have signs of an allergic reaction: hives; difficult breathing; swelling of your face, lips, tongue, or throat. Stop using this medicine and call your doctor at once if you have:  · severe discomfort, redness, or other irritation of treated skin areas. Less serious side effects may be more likely, and you may have none at all. This is not a complete list of side effects and others may occur. Call your doctor for medical advice about side effects. You may report side effects to FDA at 7-354-QXF-7951.   What other drugs will affect miconazole and zinc oxide topical?  Medicine used on the skin is not likely to be affected by other drugs you use. But many drugs can interact with each other. Tell each of your healthcare providers about all medicines you use, including prescription and over-the-counter medicines, vitamins, and herbal products. Where can I get more information? Your pharmacist can provide more information about miconazole and zinc oxide topical.  Remember, keep this and all other medicines out of the reach of children, never share your medicines with others, and use this medication only for the indication prescribed. Every effort has been made to ensure that the information provided by [address] is accurate, up-to-date, and complete, but no guarantee is made to that effect. Drug information contained herein may be time sensitive. [facility] information has been compiled for use by healthcare practitioners and consumers in the [place] and therefore [facility] does not warrant that uses outside of the [place] are appropriate, unless specifically indicated otherwise. [facility]'s drug information does not endorse drugs, diagnose patients or recommend therapy. [facility]'s drug information is an informational resource designed to assist licensed healthcare practitioners in caring for their patients and/or to serve consumers viewing this service as a supplement to, and not a substitute for, the expertise, skill, knowledge and judgment of healthcare practitioners. The absence of a warning for a given drug or drug combination in no way should be construed to indicate that the drug or drug combination is safe, effective or appropriate for any given patient. [facility] does not assume any responsibility for any aspect of healthcare administered with the aid of information [facility] provides.  The information contained herein is not intended to cover all possible uses, directions, precautions, warnings, drug interactions, allergic reactions, or adverse effects. If you have questions about the drugs you are taking, check with your doctor, nurse or pharmacist.  Copyright 1821-8673 [address]: 2.01. Revision date: 10/16/2018. Care instructions adapted under license by [facility] [address] If you have questions about a medical condition or this instruction, always ask your healthcare professional. [place] [address] any warranty or liability for your use of this information.

## 2021-07-30 NOTE — PROGRESS NOTES
o  [facility] PHYSICIAN SERVICES  [facility]  [address] [address]  Dept: [phone]  Dept Fax: [phone]  Loc: [phone]    Wilbur Phan is a 24 y.o. female who presents today for her medical conditions/complaints as noted below. Wilbur Phan is c/o of Anxiety and Rash (under both arms, gets is every summer)        HPI:     HPI   This 79-year-old female presents today for anxiety and pression. She states that she was previously on Wellbutrin and BuSpar but felt that the BuSpar was causing increased reaction with the Wellbutrin she stopped both of these medications. She also states that she has a rash under both arms that she gets every summer. Once it cools off and she is not sweating as much the rash disappears. He has tried changing deodorants to help with this she is also tried an antifungal cream.  She says that each thing helps for just a little bit but then it comes back. Denies any fever. Antifungal, hydrocortisone and zinc       Chief Complaint   Patient presents with    Anxiety    Rash     under both arms, gets is every summer     Past Medical History:   Diagnosis Date    Anxiety     Depression     PTSD (post-traumatic stress disorder)       History reviewed. No pertinent surgical history. Vitals 7/30/2021 9/22/2020 8/18/2020 2/17/2020 4/32/4447   SYSTOLIC 393 [phone]   DIASTOLIC 80 74 78 80 80   Site - - Left Upper Arm Right Upper Arm Left Upper Arm   Position - - Sitting Sitting Sitting   Cuff Size - - Medium Adult Medium Adult Large Adult   Pulse 105 98 95 107 91   Temp 97.8 97.9 97.2 97.3 97.2   Resp 18 18 16 20 18   SpO2 98 98 98 98 98   Weight 189 lb 9.6 oz 169 lb 12.8 oz 174 lb 9.6 oz 190 lb 192 lb   Height 5' 3\" 5' 3\" 5' 3\" 5' 4\" 5' 4\"   Body mass index 33.58 kg/m2 30.08 kg/m2 30.93 kg/m2 32.61 kg/m2 32.95 kg/m2       History reviewed. No pertinent family history.     Social History     Tobacco Use    Smoking status: Current Every Day Smoker     Packs/day: 0.50     Years: 3.00     Pack years: 1.50     Types: Cigarettes     Start date: 2017    Smokeless tobacco: Never Used   Substance Use Topics    Alcohol use: Yes     Alcohol/week: 2.0 standard drinks     Types: 2 Glasses of wine per week     Comment: occ      No current outpatient medications on file prior to visit. No current facility-administered medications on file prior to visit. Allergies   Allergen Reactions    Penicillins Hives       Health Maintenance   Topic Date Due    Hepatitis C screen  Never done    Varicella vaccine (1 of 2 - 2-dose childhood series) Never done    HPV vaccine (1 - 2-dose series) Never done    COVID-19 Vaccine (1) Never done    HIV screen  Never done    Chlamydia screen  Never done    Cervical cancer screen  Never done    DTaP/Tdap/Td vaccine (1 - Tdap) 09/22/2021 (Originally 1/18/2018)    Pneumococcal 0-64 years Vaccine (1 of 2 - PPSV23) 09/22/2021 (Originally 1/18/2005)    Flu vaccine (1) 09/01/2021    Hepatitis A vaccine  Aged Out    Hepatitis B vaccine  Aged Out    Hib vaccine  Aged Out    Meningococcal (ACWY) vaccine  Aged Out       Subjective:      Review of Systems   Constitutional: Negative. HENT: Negative. Eyes: Negative. Respiratory: Negative. Cardiovascular: Negative. Gastrointestinal: Negative. Endocrine: Negative. Genitourinary: Negative. Musculoskeletal: Negative. Skin: Positive for rash. Allergic/Immunologic: Negative. Neurological: Negative. Hematological: Negative. Psychiatric/Behavioral: Positive for dysphoric mood. Negative for self-injury, sleep disturbance and suicidal ideas. The patient is nervous/anxious. Objective:     Physical Exam  Vitals and nursing note reviewed. Constitutional:       Appearance: Normal appearance. HENT:      Head: Normocephalic and atraumatic.       Nose: Nose normal.      Mouth/Throat:      Mouth: Mucous membranes are moist.   Eyes: Pupils: Pupils are equal, round, and reactive to light. Cardiovascular:      Rate and Rhythm: Normal rate and regular rhythm. Pulses: Normal pulses. Heart sounds: Normal heart sounds. Pulmonary:      Effort: Pulmonary effort is normal.      Breath sounds: Normal breath sounds. Abdominal:      General: Bowel sounds are normal.      Palpations: Abdomen is soft. Musculoskeletal:         General: Normal range of motion. Cervical back: Normal range of motion. Skin:     General: Skin is warm and dry. Findings: Rash present. Rash is macular, papular and pustular. Neurological:      Mental Status: She is alert and oriented to person, place, and time. Mental status is at baseline. Psychiatric:         Mood and Affect: Mood normal.         Behavior: Behavior normal.       /80   Pulse 105   Temp 97.8 °F (36.6 °C)   Resp 18   Ht 5' 3\" (1.6 m)   Wt 189 lb 9.6 oz (86 kg)   SpO2 98%   BMI 33.59 kg/m²     Assessment:       Diagnosis Orders   1. Rash     2. Anxiety and depression           Plan:   1. Combined hydrocortisone cream with clobetasol and benzoyl peroxide erythromycin cream.  Apply to site twice a day. Wash and clean site with Dial soap or antibacterial soap twice a day. Keep clean and dry. Change to a deodorant spray or gel instead of a solid. 2.  Vraylar 1.5 mg daily. Monitor for signs and symptoms of increasing SI or HI. Patient denies both today. Make a list of previous medications that you have tried and failed in the past including reason that you are unable to tolerate treatment. Preprocedure follow-up in 1 month       Patient given educational materials -see patient instructions. Discussed use, benefit, and side effects of prescribed medications. All patient questions answered. Pt voiced understanding. Reviewed health maintenance. Instructed to continue currentmedications, diet and exercise. Patient agreed with treatment plan. Follow up as directed. MEDICATIONS:  Orders Placed This Encounter   Medications    hydrOXYzine (ATARAX) 25 MG tablet     Sig: Take 1 tablet by mouth every 8 hours as needed for Itching or Anxiety     Dispense:  120 tablet     Refill:  1    DISCONTD: clobetasol (TEMOVATE) 0.05 % ointment     Sig: Apply topically 2 times daily. Dispense:  1 Tube     Refill:  1    DISCONTD: hydrocortisone (ALA-SOPHIA) 1 % cream     Sig: Apply topically 2 times daily. Dispense:  1 Tube     Refill:  1    DISCONTD: benzoyl peroxide-erythromycin (BENZAMYCIN) 5-3 % gel     Sig: Apply topically 2 times daily. Dispense:  46.6 g     Refill:  2    cariprazine hcl (VRAYLAR) 1.5 MG capsule     Sig: Take 1 capsule by mouth daily     Dispense:  30 capsule     Refill:  0         ORDERS:  No orders of the defined types were placed in this encounter. Follow-up:  Return in about 4 weeks (around 8/27/2021) for to see Dr. Astrid De Jesus. PATIENT INSTRUCTIONS:  Patient Instructions     Patient Education        benzoyl peroxide topical  Pronunciation:  GREG zoyl per OX rosa isela  Brand:  Acne-Clear, Benzac AC, BenzePrO, Benziq, Brevoxyl Acne Wash Kit, Clearskin, Fostex Wash 10%, NeoBenz Micro, Neutrogena Acne Mask, Oscion, Oxy Daily Wash, Oxy-10, Pacnex, PanOxyl, Persa-Gel, Riax, SoluCLENZ Rx, Darra Myla  What is the most important information I should know about benzoyl peroxide topical?  Benzoyl peroxide can cause a rare but serious allergic reaction or severe skin irritation. Stop using this medicine and get emergency medical help if you have: hives, itching; difficult breathing, feeling light-headed; or swelling of your face, lips, tongue, or throat. What is benzoyl peroxide topical?  Benzoyl peroxide has an antibacterial effect. It also has a mild drying effect, which allows excess oils and dirt to be easily washed away from the skin. Benzoyl peroxide topical (for the skin) is used to treat acne.   There are many brands and forms of benzoyl peroxide available. Not all brands are listed on this leaflet. Benzoyl peroxide topical may also be used for purposes not listed in this medication guide. What should I discuss with my healthcare provider before using benzoyl peroxide topical?  You should not use benzoyl peroxide if you are allergic to it, or if you have:  · very sensitive skin. Ask a doctor or pharmacist if this medicine is safe to use if you have any skin conditions or allergies. Ask a doctor before using this medicine if you are pregnant or breastfeeding. Do not use this medicine on a child without medical advice. How should I use benzoyl peroxide topical?  Benzoyl peroxide topical can cause a rare but serious allergic reaction or severe skin irritation. Before you start using this medicine, you may choose to apply a \"test dose\" to see if you have a reaction. Apply a very small amount of the medicine to 1 or 2 small acne areas every day for 3 days in a row. If there is no reaction, begin using the full prescribed amount on the 4th day. Use exactly as directed on the label, or as prescribed by your doctor. Do not take by mouth. Topical medicine is for use only on the skin. Do not use on open wounds or on sunburned, windburned, dry, or irritated skin. Also avoid using benzoyl peroxide topical on wounds or on areas of eczema. Wait until these conditions have healed before using this medication. Wash your hands before and after applying this medication. Clean and pat dry the skin to be treated. Apply benzoyl peroxide in a thin layer and rub in gently. Read and carefully follow any Instructions for Use provided with your medicine. Ask your doctor or pharmacist if you do not understand these instructions. You may need to shake the medicine just before each use. Do not cover the treated skin area unless your doctor has told you to. Benzoyl peroxide may bleach hair or fabrics.  Avoid allowing this medication to come into contact with your hair or clothing. It may take several weeks before your symptoms improve. Keep using the medication as directed and tell your doctor if your symptoms do not improve. Store at room temperature away from moisture and heat. What happens if I miss a dose? Apply the medicine as soon as you can, but skip the missed dose if it is almost time for your next dose. Do not apply two doses at one time. What happens if I overdose? Seek emergency medical attention or call the Poison Help line at 1-383.183.1274. What should I avoid while using benzoyl peroxide topical?  Rinse with water if this medicine gets in your eyes or mouth. Avoid using skin products that can cause irritation, such as harsh soaps, shampoos, hair coloring or permanent chemicals, hair removers or waxes, or skin products with alcohol, spices, astringents, or lime. Avoid exposure to sunlight or tanning beds. Wear protective clothing and use sunscreen (SPF 30 or higher) when you are outdoors. What are the possible side effects of benzoyl peroxide topical?  Benzoyl peroxide topical can cause a rare but serious allergic reaction or severe skin irritation. These reactions may occur just a few minutes after you apply the medicine, or within a day or longer afterward. Stop using this medicine and get emergency medical help if you have signs of an allergic reaction: hives, itching; difficult breathing, feeling light-headed; swelling of your face, lips, tongue, or throat. Stop using benzoyl peroxide and call your doctor at once if you have any of these side effects on the treated skin:  · severe itching or burning;  · severe stinging or redness;  · swelling; or  · peeling. Common side effects may include:  · mild stinging or burning;  · itching or tingly feeling;  · skin dryness, peeling, or flaking; or  · redness or other irritation. This is not a complete list of side effects and others may occur. Call your doctor for medical advice about side effects.  You may report side effects to FDA at 4-310-FDA-8561. What other drugs will affect benzoyl peroxide topical?  Applying benzoyl peroxide while you are also using tretinoin topical medicine may cause severe skin irritation. Brands that contain tretinoin include Avita, Renova, Retin-A, Tretin-X, and others. Medicine used on the skin is not likely to be affected by other drugs you use. But many drugs can interact with each other. Tell each of your health care providers about all medicines you use, including prescription and over-the-counter medicines, vitamins, and herbal products. Where can I get more information? Your pharmacist can provide more information about benzoyl peroxide topical.  Remember, keep this and all other medicines out of the reach of children, never share your medicines with others, and use this medication only for the indication prescribed. Every effort has been made to ensure that the information provided by [address] is accurate, up-to-date, and complete, but no guarantee is made to that effect. Drug information contained herein may be time sensitive. [employer] information has been compiled for use by healthcare practitioners and consumers in the [place] and therefore [employer] does not warrant that uses outside of the [place] are appropriate, unless specifically indicated otherwise. [facility]'s drug information does not endorse drugs, diagnose patients or recommend therapy. [facility] drug information is an informational resource designed to assist licensed healthcare practitioners in caring for their patients and/or to serve consumers viewing this service as a supplement to, and not a substitute for, the expertise, skill, knowledge and judgment of healthcare practitioners. The absence of a warning for a given drug or drug combination in no way should be construed to indicate that the drug or drug combination is safe, effective or appropriate for any given patient.  [employer] does not assume any responsibility for any aspect of healthcare administered with the aid of information [facility] provides. The information contained herein is not intended to cover all possible uses, directions, precautions, warnings, drug interactions, allergic reactions, or adverse effects. If you have questions about the drugs you are taking, check with your doctor, nurse or pharmacist.  Copyright 3120-8539 [address] [address] [place] [address] Avenue: 10.03. Revision date: 3/15/2020. Care instructions adapted under license by [facility] ([facility]). If you have questions about a medical condition or this instruction, always ask your healthcare professional. [place] [address] any warranty or liability for your use of this information. Patient Education        bacitracin topical  Pronunciation:  BAS i TRAY sin TOP i peggy  Brand:  Baciguent  What is the most important information I should know about bacitracin? Follow all directions on your medicine label and package. Tell each of your healthcare providers about all your medical conditions, allergies, and all medicines you use. What is bacitracin? Bacitracin is an antibiotic that fights bacteria. Bacitracin topical (for the skin) is used to prevent infection in minor cuts, scrapes, and burns. Bacitracin may also be used for purposes not listed in this medication guide. What should I discuss with my health care provider before using bacitracin? You should not use this medicine if you are allergic to bacitracin, mineral oil, or petroleum jelly. Do not use bacitracin topical to treat animal bites, puncture wounds, deep skin wounds, or severe burns. Ask a doctor or pharmacist if it is safe for you to use this medicine if you are not sure. Bacitracin is not expected to harm an unborn baby. It is not known whether bacitracin passes into breast milk or if it could harm a nursing baby.   Ask a doctor or pharmacist if it is safe for you to use this medicine if you are pregnant or breast-feeding. How should I use bacitracin? Use exactly as directed on the label, or as prescribed by your doctor. Do not use in larger or smaller amounts or for longer than recommended. Clean the skin area before applying bacitracin topical.  You may apply bacitracin to the affected area 1 to 3 times per day. Apply only enough to cover the area you are treating. Do not use this medication over large areas of skin. You may cover the treated skin with a bandage. Do not use bacitracin for longer than 7 days in a row. Call your doctor if your symptoms do not improve after 7 days of treatment, or if you develop a skin rash. Store at room temperature away from moisture and heat. Do not freeze. What happens if I miss a dose? Since bacitracin topical is when needed, you are not likely to miss a dose. What happens if I overdose? Seek emergency medical attention or call the Poison Help line at 1-951.112.1241. What should I avoid while using bacitracin? Bacitracin topical is for use only on the skin. Avoid getting this medication in your eyes, nose, mouth, rectum, or vagina. If this does happen, rinse with water. What are the possible side effects of bacitracin? Get emergency medical help if you have signs of an allergic reaction:  hives; difficult breathing; swelling of your face, lips, tongue, or throat. Less serious side effects are more likely, and you may have none at all. This is not a complete list of side effects and others may occur. Call your doctor for medical advice about side effects. You may report side effects to FDA at 4-586-ZHM-4448. What other drugs will affect bacitracin? It is not likely that other drugs you take orally or inject will have an effect on topically applied bacitracin. But many drugs can interact with each other.  Tell each of your health care providers about all medicines you use, including prescription and over-the-counter medicines, vitamins, and herbal condition or this instruction, always ask your healthcare professional. [place] [address] any warranty or liability for your use of this information. Patient Education        zinc oxide topical  Pronunciation:  LUKE natarajan  Brand:  ARC, Balmex, Radha Butt Paste, Caldesene, Calmol-4 Suppository, Critic-Aid Skin Paste, Jerald Heck, Dr. Yesica Keen Rash + Skin, Erik Buttocks Ointment, Isa-Protect, Pinxav, Rash Relief, Secura Protective Cream, Seniortopix Healix, Unna-Flex Elastic Unna Boot 4 inch, Z-Bum, Znlin  What is the most important information I should know about zinc oxide topical?  Follow all directions on your medicine label and package. Tell each of your healthcare providers about all your medical conditions, allergies, and all medicines you use. What is zinc oxide topical?  Zinc oxide is a mineral.  Zinc oxide topical (for the skin) is used to treat diaper rash, minor burns, severely chapped skin, or other minor skin irritations. Zinc oxide rectal suppositories are used to treat itching, burning, irritation, and other rectal discomfort caused by hemorrhoids or painful bowel movements. There are many brands and forms of zinc oxide available. Not all brands are listed on this leaflet. Zinc oxide topical may also be used for purposes not listed in this medication guide. What should I discuss with my healthcare provider before using zinc oxide topical?  You should not use this medicine if you are allergic to zinc, dimethicone, lanolin, cod liver oil, petroleum jelly, parabens, mineral oil, or wax. Zinc oxide topical will not treat a bacterial or fungal infection. Call your doctor if you have any signs of infection such as redness and warmth or oozing skin lesions. Ask a doctor before using this medicine if you are pregnant or breastfeeding. How should I use zinc oxide topical?  Use exactly as directed on the label, or as prescribed by your doctor. Do not take by mouth. Topical medicine  is for use only on the skin. A rectal suppository is for use only in your rectum. Apply enough of this medicine to cover the entire area to be treated. Zinc oxide often leaves a thin white residue that may not be entirely rubbed in. To treat chapped skin, minor burn wounds, or other skin irritations, use the medication as often as needed. Apply a thin layer to the affected area and rub in gently. To treat diaper rash, use zinc oxide topical each time the diaper is changed. Also apply the medicine at bedtime or whenever there will be a long period of time between diaper changes. Change wet diapers as soon as possible. Keep the diaper area clean and dry. When using the zinc oxide topical powder, pour the powder slowly to avoid a large puff into the air. Do not allow a baby to handle a powder bottle during use. Always close the lid after using the powder. Wash your hands before and after inserting the rectal suppository. Remove the wrapper before inserting the suppository. Avoid handling the suppository too long or it will melt. Lie on your back with your knees up toward your chest. Gently insert the suppository into your rectum about 1 inch, pointed tip first.  Stay lying down for a few minutes while the suppository melts. You should feel little or no discomfort. Avoid using the bathroom for at least an hour. Call your doctor if your symptoms do not improve after 7 days of treatment. Store at room temperature away from moisture and heat. Keep the tube cap tightly closed when not in use. You may store zinc oxide rectal suppositories in a refrigerator to prevent melting. What happens if I miss a dose? Since this medicine is used when needed, you may not be on a dosing schedule. Skip any missed dose if it's almost time for your next dose. Do not use two doses at one time. What happens if I overdose? An overdose of zinc oxide is not expected to be dangerous.  Seek emergency medical attention or call the Poison Help line at 1-623.913.8822 if anyone has accidentally swallowed the medication. What should I avoid while using zinc oxide topical?  Do not use zinc oxide topical on deep skin wounds or severe burns. Avoid using other medications on the areas you treat with zinc oxide unless your doctor tells you to. Rinse with water if this medicine gets in your eyes. What are the possible side effects of zinc oxide topical?  Get emergency medical help if you have signs of an allergic reaction: hives; difficult breathing; swelling of your face, lips, tongue, or throat. Stop using zinc oxide rectal suppositories and call your doctor if you have rectal bleeding or continued pain. This is not a complete list of side effects and others may occur. Call your doctor for medical advice about side effects. You may report side effects to FDA at 2-605-IGT-4182. What other drugs will affect zinc oxide topical?  Medicine used on the skin is not likely to be affected by other drugs you use. But many drugs can interact with each other. Tell each of your healthcare providers about all medicines you use, including prescription and over-the-counter medicines, vitamins, and herbal products. Where can I get more information? Your pharmacist can provide more information about zinc oxide topical.  Remember, keep this and all other medicines out of the reach of children, never share your medicines with others, and use this medication only for the indication prescribed. Every effort has been made to ensure that the information provided by [address] is accurate, up-to-date, and complete, but no guarantee is made to that effect. Drug information contained herein may be time sensitive.  [employer] information has been compiled for use by healthcare practitioners and consumers in the [place] and therefore [employer] does not warrant that uses outside of the [place] are appropriate, unless specifically indicated otherwise. [facility] information does not endorse drugs, diagnose patients or recommend therapy. [facility] drug information is an informational resource designed to assist licensed healthcare practitioners in caring for their patients and/or to serve consumers viewing this service as a supplement to, and not a substitute for, the expertise, skill, knowledge and judgment of healthcare practitioners. The absence of a warning for a given drug or drug combination in no way should be construed to indicate that the drug or drug combination is safe, effective or appropriate for any given patient. [facility] [facility] does not assume any responsibility for any aspect of healthcare administered with the aid of information [facility] provides. The information contained herein is not intended to cover all possible uses, directions, precautions, warnings, drug interactions, allergic reactions, or adverse effects. If you have questions about the drugs you are taking, check with your doctor, nurse or pharmacist.  Copyright 9366-3142 [address] Ricky: 4.02. Revision date: 6/26/2019. Care instructions adapted under license by [facility] [place] ([facility]). If you have questions about a medical condition or this instruction, always ask your healthcare professional. [place] [address] any warranty or liability for your use of this information. Patient Education        miconazole and zinc oxide topical  Pronunciation:  mymaris MEEHAN a zole and LUKE OLMSTEAD rosa isela TOP ik al  Brand:  Rash Relief Antifungal, Vusion  What is the most important information I should know about miconazole and zinc oxide topical?  This medicine should not be used to prevent diaper rash. What is miconazole and zinc oxide topical?  Miconazole and zinc oxide topical (for the skin) is a combination antifungal medicine that fights infections caused by fungus.   The ointment form is used to treat diaper rash with yeast infection (candidiasis) in children and babies who are at least 2 weeks old. Miconazole and zinc oxide topical ointment is for use only on diaper rash that has been diagnosed by a doctor. The spray form is used to treat fungal infections of the skin, such as athlete's foot (tinea pedis), jock itch (tinea cruris), or ringworm (tinea corporis). Miconazole and zinc oxide topical spray is for use by adults and children who are at least 3years old. Miconazole and zinc oxide topical should not be used to prevent diaper rash in either children or incontinent adults. Miconazole and zinc oxide topical may also be used for purposes not listed in this medication guide. What should I discuss with my healthcare provider before using miconazole and zinc oxide topical?  You should not use this medicine if you are allergic to miconazole, zinc, dimethicone, mineral oil, petroleum, or lanolin. Do not use the ointment form of this medicine on a child younger than 2 weeks old. Ask a doctor before using the topical spray on a child younger than 3years old. Tell your doctor if you have ever had:  · a weak immune system caused by disease or by using certain medicines. Ask a doctor before using this medicine if you are pregnant or breast-feeding. How should I use miconazole and zinc oxide topical?  Use exactly as directed on the label, or as prescribed by your doctor. Your doctor may perform lab tests to make sure you have the type of infection that this medicine can treat effectively. Read and carefully follow any Instructions for Use provided with your medicine. Ask your doctor or pharmacist if you do not understand these instructions. Clean and dry the affected area before applying this medicine. You may use a mild soap. Use the ointment  for 1 week, each time you change a diaper. This medicine will not be effective without frequent diaper changes. Change your child's diapers as soon as they become wet or soiled. Keep the diaper area clean and dry.   Do not use miconazole and zinc oxide topical to prevent diaper rash or you may increase your child's risk of infection that is resistant to treatment. Miconazole and zinc oxide topical ointment is not for use on general diaper rash without a related yeast infection. Allow the spray  to dry completely before you dress. There is no need to rub in the medicine. Use this medicine for the full prescribed length of time, even if your symptoms quickly improve. Skipping doses can increase your risk of infection that is resistant to medication. Do not share this medicine with another person, even if they have the same symptoms you have. Stop using the medicine and call your doctor if symptoms do not improve, or if they get worse. Diaper rash should start to improve within 1 week of use. Jock itch should improve within 2 weeks, and athlete's foot should improve within 4 weeks. For best results, use this medicine as directed and follow all instructions for keeping the treatment area clean and dry. Store at room temperature away from moisture and heat. Keep the tube or bottle tightly capped when not in use. What happens if I miss a dose? Since miconazole and zinc oxide topical ointment is used with each diaper change, you are not likely to miss a dose. If you are using the spray on a schedule, use the medicine as soon as you can. Skip the missed dose if it is almost time for your next dose. Do not use two doses at one time. What happens if I overdose? An overdose of miconazole and zinc oxide topical is not expected to be dangerous. Seek emergency medical attention or call the Poison Help line at 1-935.106.3062 if anyone has accidentally swallowed the medication. What should I avoid while using miconazole and zinc oxide topical?  Avoid covering treated skin areas with tight-fitting, synthetic clothing (such as nylon or polyester clothing, or plastic pants) that does not allow air to circulate to your skin.  If you are treating your feet, wear clean cotton socks and sandals or shoes that allow for air circulation. Keep your feet as dry as possible. Rinse with water if this medicine gets in your eyes. Avoid using scented or perfumed soaps or lotions to clean the diaper area. Avoid getting this medication in the mouth or vagina. What are the possible side effects of miconazole and zinc oxide topical?  Get emergency medical help if you have signs of an allergic reaction: hives; difficult breathing; swelling of your face, lips, tongue, or throat. Stop using this medicine and call your doctor at once if you have:  · severe discomfort, redness, or other irritation of treated skin areas. Less serious side effects may be more likely, and you may have none at all. This is not a complete list of side effects and others may occur. Call your doctor for medical advice about side effects. You may report side effects to FDA at 8-568-FDA-3457. What other drugs will affect miconazole and zinc oxide topical?  Medicine used on the skin is not likely to be affected by other drugs you use. But many drugs can interact with each other. Tell each of your healthcare providers about all medicines you use, including prescription and over-the-counter medicines, vitamins, and herbal products. Where can I get more information? Your pharmacist can provide more information about miconazole and zinc oxide topical.  Remember, keep this and all other medicines out of the reach of children, never share your medicines with others, and use this medication only for the indication prescribed. Every effort has been made to ensure that the information provided by [address] is accurate, up-to-date, and complete, but no guarantee is made to that effect. Drug information contained herein may be time sensitive.  [facility] information has been compiled for use by healthcare practitioners and consumers in the [place] and therefore [facility] does not warrant that uses outside of the [place] are appropriate, unless specifically indicated otherwise. [facility] information does not endorse drugs, diagnose patients or recommend therapy. [facility] drug information is an informational resource designed to assist licensed healthcare practitioners in caring for their patients and/or to serve consumers viewing this service as a supplement to, and not a substitute for, the expertise, skill, knowledge and judgment of healthcare practitioners. The absence of a warning for a given drug or drug combination in no way should be construed to indicate that the drug or drug combination is safe, effective or appropriate for any given patient. [facility] does not assume any responsibility for any aspect of healthcare administered with the aid of information [facility] provides. The information contained herein is not intended to cover all possible uses, directions, precautions, warnings, drug interactions, allergic reactions, or adverse effects. If you have questions about the drugs you are taking, check with your doctor, nurse or pharmacist.  Copyright 4270-0842 [address]: 2.. Revision date: 10/16/2018. Care instructions adapted under license by [facility] ([facility]). If you have questions about a medical condition or this instruction, always ask your healthcare professional. [place] [address] any warranty or liability for your use of this information. Electronically signed by CASSI Jorge NP on 7/30/2021 at 5:19 PM    EMR Dragon/transcription disclaimer:  Much of thisencounter note is electronic transcription/translation of spoken language to printed texts. The electronic translation of spoken language may be erroneous, or at times, nonsensical words or phrases may be inadvertentlytranscribed.   Although I have reviewed the note for such errors, some may still exist.

## 2021-07-30 NOTE — TELEPHONE ENCOUNTER
Pt states Insurance will not cover cream that was sent in under [facility].  Must be sent by MD. I will resend under [facility]

## 2021-08-13 ENCOUNTER — OFFICE VISIT (OUTPATIENT)
Dept: PRIMARY CARE CLINIC | Age: 21
End: 2021-08-13
Payer: MEDICAID

## 2021-08-13 VITALS
WEIGHT: 185 LBS | HEIGHT: 63 IN | HEART RATE: 94 BPM | SYSTOLIC BLOOD PRESSURE: 110 MMHG | DIASTOLIC BLOOD PRESSURE: 76 MMHG | TEMPERATURE: 97.6 F | BODY MASS INDEX: 32.78 KG/M2 | OXYGEN SATURATION: 98 %

## 2021-08-13 DIAGNOSIS — R21 RASH: ICD-10-CM

## 2021-08-13 DIAGNOSIS — Z00.00 ANNUAL PHYSICAL EXAM: ICD-10-CM

## 2021-08-13 DIAGNOSIS — F41.9 ANXIETY AND DEPRESSION: Primary | ICD-10-CM

## 2021-08-13 DIAGNOSIS — F32.A ANXIETY AND DEPRESSION: Primary | ICD-10-CM

## 2021-08-13 LAB
ALBUMIN SERPL-MCNC: 4.2 G/DL (ref 3.5–5.2)
ALP BLD-CCNC: 74 U/L (ref 35–104)
ALT SERPL-CCNC: 28 U/L (ref 5–33)
ANION GAP SERPL CALCULATED.3IONS-SCNC: 13 MMOL/L (ref 7–19)
AST SERPL-CCNC: 19 U/L (ref 5–32)
BASOPHILS ABSOLUTE: 0.1 K/UL (ref 0–0.2)
BASOPHILS RELATIVE PERCENT: 1.1 % (ref 0–1)
BILIRUB SERPL-MCNC: 0.4 MG/DL (ref 0.2–1.2)
BUN BLDV-MCNC: 8 MG/DL (ref 6–20)
CALCIUM SERPL-MCNC: 9.4 MG/DL (ref 8.6–10)
CHLORIDE BLD-SCNC: 103 MMOL/L (ref 98–111)
CHOLESTEROL, TOTAL: 218 MG/DL (ref 160–199)
CO2: 24 MMOL/L (ref 22–29)
CREAT SERPL-MCNC: 0.8 MG/DL (ref 0.5–0.9)
EOSINOPHILS ABSOLUTE: 0.3 K/UL (ref 0–0.6)
EOSINOPHILS RELATIVE PERCENT: 3.9 % (ref 0–5)
GFR AFRICAN AMERICAN: >59
GFR NON-AFRICAN AMERICAN: >60
GLUCOSE BLD-MCNC: 89 MG/DL (ref 74–109)
HCT VFR BLD CALC: 41.3 % (ref 37–47)
HDLC SERPL-MCNC: 64 MG/DL (ref 65–121)
HEMOGLOBIN: 13.3 G/DL (ref 12–16)
IMMATURE GRANULOCYTES #: 0 K/UL
LDL CHOLESTEROL CALCULATED: 115 MG/DL
LYMPHOCYTES ABSOLUTE: 2 K/UL (ref 1.1–4.5)
LYMPHOCYTES RELATIVE PERCENT: 23.1 % (ref 20–40)
MCH RBC QN AUTO: 27.9 PG (ref 27–31)
MCHC RBC AUTO-ENTMCNC: 32.2 G/DL (ref 33–37)
MCV RBC AUTO: 86.6 FL (ref 81–99)
MONOCYTES ABSOLUTE: 0.7 K/UL (ref 0–0.9)
MONOCYTES RELATIVE PERCENT: 7.7 % (ref 0–10)
NEUTROPHILS ABSOLUTE: 5.5 K/UL (ref 1.5–7.5)
NEUTROPHILS RELATIVE PERCENT: 64 % (ref 50–65)
PDW BLD-RTO: 13.2 % (ref 11.5–14.5)
PLATELET # BLD: 275 K/UL (ref 130–400)
PMV BLD AUTO: 10.7 FL (ref 9.4–12.3)
POTASSIUM SERPL-SCNC: 4.3 MMOL/L (ref 3.5–5)
RBC # BLD: 4.77 M/UL (ref 4.2–5.4)
SODIUM BLD-SCNC: 140 MMOL/L (ref 136–145)
T4 FREE: 1.21 NG/DL (ref 0.93–1.7)
TOTAL PROTEIN: 7.6 G/DL (ref 6.6–8.7)
TRIGL SERPL-MCNC: 197 MG/DL (ref 0–149)
TSH SERPL DL<=0.05 MIU/L-ACNC: 1.92 UIU/ML (ref 0.27–4.2)
WBC # BLD: 8.6 K/UL (ref 4.8–10.8)

## 2021-08-13 PROCEDURE — G8417 CALC BMI ABV UP PARAM F/U: HCPCS | Performed by: NURSE PRACTITIONER

## 2021-08-13 PROCEDURE — 4004F PT TOBACCO SCREEN RCVD TLK: CPT | Performed by: NURSE PRACTITIONER

## 2021-08-13 PROCEDURE — 99214 OFFICE O/P EST MOD 30 MIN: CPT | Performed by: NURSE PRACTITIONER

## 2021-08-13 PROCEDURE — G8427 DOCREV CUR MEDS BY ELIG CLIN: HCPCS | Performed by: NURSE PRACTITIONER

## 2021-08-13 RX ORDER — NYSTATIN 100000 [USP'U]/G
POWDER TOPICAL
Qty: 1 BOTTLE | Refills: 1 | Status: SHIPPED | OUTPATIENT
Start: 2021-08-13 | End: 2021-08-13 | Stop reason: SDUPTHER

## 2021-08-13 RX ORDER — HYDROXYZINE HYDROCHLORIDE 25 MG/1
25 TABLET, FILM COATED ORAL EVERY 8 HOURS PRN
COMMUNITY
End: 2021-09-10 | Stop reason: SINTOL

## 2021-08-13 RX ORDER — DIAPER,BRIEF,INFANT-TODD,DISP
EACH MISCELLANEOUS 2 TIMES DAILY
COMMUNITY
End: 2021-10-11

## 2021-08-13 RX ORDER — DOXYCYCLINE HYCLATE 50 MG/1
50 CAPSULE ORAL 2 TIMES DAILY
Qty: 28 CAPSULE | Refills: 0 | Status: SHIPPED | OUTPATIENT
Start: 2021-08-13 | End: 2021-08-13 | Stop reason: SDUPTHER

## 2021-08-13 RX ORDER — NYSTATIN 100000 [USP'U]/G
POWDER TOPICAL
Qty: 1 BOTTLE | Refills: 1 | Status: SHIPPED | OUTPATIENT
Start: 2021-08-13 | End: 2021-10-11

## 2021-08-13 RX ORDER — DOXYCYCLINE HYCLATE 50 MG/1
50 CAPSULE ORAL 2 TIMES DAILY
Qty: 28 CAPSULE | Refills: 0 | Status: SHIPPED | OUTPATIENT
Start: 2021-08-13 | End: 2021-09-10 | Stop reason: SDUPTHER

## 2021-08-13 ASSESSMENT — ENCOUNTER SYMPTOMS
RESPIRATORY NEGATIVE: 1
EYES NEGATIVE: 1
ALLERGIC/IMMUNOLOGIC NEGATIVE: 1
GASTROINTESTINAL NEGATIVE: 1

## 2021-08-13 NOTE — PROGRESS NOTES
[facility]  [facility]  [address] [place] [address]  [address] [place] [address]  Dept: [phone]  Dept Fax: [phone]  Loc: [phone]    Chente George is a 24 y.o. female who presents today for her medical conditions/complaints as noted below. Chente George is c/o of 2 Week Follow-Up (She is here for a 2 week follow up on rash, anxiety, and depression. She states her rash is worse. She feels like the Cathlene Rao is helping but the Hydroxyzine is making her very sleeping and not helping with her anxiety. She is fasting for labs today.)        HPI:     HPI   43-year-old female presents today for 2-week follow-up. She is here for follow-up for her rash, anxiety and depression. She states that the rash she feels is worse. She feels like the creams have increased this. She states that she feels like the Cathlene Rao is helping with her anxiety depression but that the hydroxyzine is making her very sleepy. She is fasting for labs today. She also brings a list with her today of medication she has taken in the past for her anxiety and depression which have failed to include  vibyrd , Buspar, escalipram , aripiprozole. Paroxetine. Chief Complaint   Patient presents with    2 Week Follow-Up     She is here for a 2 week follow up on rash, anxiety, and depression. She states her rash is worse. She feels like the Cathlene Rao is helping but the Hydroxyzine is making her very sleeping and not helping with her anxiety. She is fasting for labs today. Past Medical History:   Diagnosis Date    Anxiety     Depression     PTSD (post-traumatic stress disorder)       No past surgical history on file.     Vitals 8/13/2021 7/30/2021 9/22/2020 8/18/2020 2/17/2020 7/77/1333   SYSTOLIC [id number] [phone]   DIASTOLIC 76 80 74 [phone] 80 80   Site Left Upper Arm - - Left Upper Arm Right Upper Arm Left Upper Arm   Position Sitting - - Sitting Sitting Sitting   Cuff Size Medium Adult - - Medium Adult Medium Adult Aged Out       Subjective:      Review of Systems   Constitutional: Negative. HENT: Negative. Eyes: Negative. Respiratory: Negative. Cardiovascular: Negative. Gastrointestinal: Negative. Endocrine: Negative. Genitourinary: Negative. Musculoskeletal: Negative. Skin: Negative. Allergic/Immunologic: Negative. Neurological: Negative. Hematological: Negative. Psychiatric/Behavioral: Negative. Objective:     Physical Exam  Vitals and nursing note reviewed. Constitutional:       General: She is not in acute distress. Appearance: Normal appearance. She is not ill-appearing or toxic-appearing. HENT:      Head: Normocephalic and atraumatic. Nose: Nose normal.      Mouth/Throat:      Mouth: Mucous membranes are moist.   Eyes:      Pupils: Pupils are equal, round, and reactive to light. Cardiovascular:      Rate and Rhythm: Normal rate and regular rhythm. Pulses: Normal pulses. Heart sounds: Normal heart sounds. Pulmonary:      Effort: Pulmonary effort is normal. No respiratory distress. Breath sounds: Normal breath sounds. No wheezing, rhonchi or rales. Abdominal:      General: Bowel sounds are normal.      Palpations: Abdomen is soft. Musculoskeletal:         General: Normal range of motion. Cervical back: Normal range of motion. Skin:     General: Skin is warm and dry. Neurological:      Mental Status: She is alert and oriented to person, place, and time. Mental status is at baseline. Psychiatric:         Attention and Perception: Attention normal.         Mood and Affect: Mood and affect normal.         Speech: Speech normal.         Behavior: Behavior normal. Behavior is cooperative. Thought Content: Thought content normal. Thought content does not include homicidal or suicidal ideation.          Cognition and Memory: Cognition normal.         Judgment: Judgment normal.       /76 (Site: Left Upper Arm, Position: Sitting, Cuff Size: Medium Adult)   Pulse 94   Temp 97.6 °F (36.4 °C)   Ht 5' 3\" (1.6 m)   Wt 185 lb (83.9 kg)   SpO2 98%   BMI 32.77 kg/m²     Assessment:       Diagnosis Orders   1. Anxiety and depression  TSH without Reflex    T4, Free    CBC Auto Differential    Comprehensive Metabolic Panel    Lipid Panel   2. Annual physical exam  TSH without Reflex    T4, Free    CBC Auto Differential    Comprehensive Metabolic Panel    Lipid Panel   3. Rash           Plan:   1. Continue Vraylar increase dose to 3 mg daily. Continue hydroxyzine up to every 8 hours for anxiety. 2.  Fasting labs today to include CBC, CMP, lipid panel, TSH, T4.    3.  Doxycycline take as directed until complete. Antibiotic Therapy  Take your antibiotic as prescribed. Take all of your antibiotics. You should not have any left over. Many antibiotics may cause diarrhea. . you can start an OTC probiotic to support normal gut bacteria. If you are on birth control, you need to use an alternative method of contraceptive for one month as antibiotics can reduce the effectiveness of oral BC. Always monitor for signs of allergic reaction such as itching, hives or any difficulty swallowing or breathing STOP THE MEDICATION IMMEDIATELY. If difficulty breathing, call 911 and go to the ER. If hives and itching, contact provider through [address] or phone for alternative antibiotics or be seen if necessary. Nystatin powder apply under arms and under breast twice a day. Patient given educational materials -see patient instructions. Discussed use, benefit, and side effects of prescribed medications. All patient questions answered. Pt voiced understanding. Reviewed health maintenance. Instructed to continue currentmedications, diet and exercise. Patient agreed with treatment plan. Follow up as directed.    MEDICATIONS:  Orders Placed This Encounter   Medications    cariprazine hcl (VRAYLAR) 3 MG CAPS capsule     Sig: Take 1 capsule by

## 2021-09-10 ENCOUNTER — OFFICE VISIT (OUTPATIENT)
Dept: PRIMARY CARE CLINIC | Age: 21
End: 2021-09-10
Payer: MEDICAID

## 2021-09-10 VITALS
SYSTOLIC BLOOD PRESSURE: 120 MMHG | TEMPERATURE: 97.1 F | DIASTOLIC BLOOD PRESSURE: 80 MMHG | HEIGHT: 63 IN | WEIGHT: 183 LBS | HEART RATE: 86 BPM | OXYGEN SATURATION: 98 % | BODY MASS INDEX: 32.43 KG/M2

## 2021-09-10 DIAGNOSIS — F43.10 PTSD (POST-TRAUMATIC STRESS DISORDER): ICD-10-CM

## 2021-09-10 DIAGNOSIS — F41.9 ANXIETY AND DEPRESSION: Primary | ICD-10-CM

## 2021-09-10 DIAGNOSIS — F32.A ANXIETY AND DEPRESSION: Primary | ICD-10-CM

## 2021-09-10 PROCEDURE — G8417 CALC BMI ABV UP PARAM F/U: HCPCS | Performed by: NURSE PRACTITIONER

## 2021-09-10 PROCEDURE — G8427 DOCREV CUR MEDS BY ELIG CLIN: HCPCS | Performed by: NURSE PRACTITIONER

## 2021-09-10 PROCEDURE — 99213 OFFICE O/P EST LOW 20 MIN: CPT | Performed by: NURSE PRACTITIONER

## 2021-09-10 PROCEDURE — 4004F PT TOBACCO SCREEN RCVD TLK: CPT | Performed by: NURSE PRACTITIONER

## 2021-09-10 RX ORDER — DOXYCYCLINE HYCLATE 50 MG/1
50 CAPSULE ORAL 2 TIMES DAILY
Qty: 28 CAPSULE | Refills: 0 | Status: SHIPPED | OUTPATIENT
Start: 2021-09-10 | End: 2021-09-24

## 2021-09-10 RX ORDER — LAMOTRIGINE 25 MG/1
25 TABLET ORAL 2 TIMES DAILY
Qty: 30 TABLET | Refills: 3 | Status: SHIPPED | OUTPATIENT
Start: 2021-09-10 | End: 2022-02-08

## 2021-09-10 RX ORDER — NORELGESTROMIN AND ETHINLY ESTRADIOL 150; 35 UG/D; UG/D
PATCH TRANSDERMAL
COMMUNITY
Start: 2021-08-26

## 2021-09-10 ASSESSMENT — ENCOUNTER SYMPTOMS
GASTROINTESTINAL NEGATIVE: 1
ALLERGIC/IMMUNOLOGIC NEGATIVE: 1
RESPIRATORY NEGATIVE: 1
EYES NEGATIVE: 1

## 2021-09-10 NOTE — PATIENT INSTRUCTIONS
Patient Education        lamotrigine  Pronunciation:  ed Ames Dials  Brand: LaMICtal, LaMICtal ODT, LaMICtal XR, Subvenite  What is the most important information I should know about lamotrigine? Lamotrigine may cause a severe or life-threatening skin rash, especially in children and in people who take a very high starting dose, or those who also take valproic acid (Depakene) or divalproex (Depakote). Seek emergency medical attention if you have a skin rash, hives, blistering, peeling, or sores in your mouth or around your eyes. Call your doctor at once if you have signs of other serious side effects, including: fever, swollen glands, severe muscle pain, bruising or unusual bleeding, yellowing of your skin or eyes, headache, neck stiffness, vomiting, confusion, or increased sensitivity to light. Some people have thoughts about suicide while taking lamotrigine. Stay alert to changes in your mood or symptoms. Report any new or worsening symptoms to your doctor. What is lamotrigine? Lamotrigine is an anti-epileptic medication, also called an anticonvulsant. Lamotrigine is used alone or with other medications to treat epileptic seizures in adults and children. Lamotrigine is also used to delay mood episodes in adults with bipolar disorder (manic depression). Immediate-release lamotrigine can be used in children as young as 3years old when it is given as part of a combination of seizure medications. However, this form should not be used as a single medication in a child or teenager who is younger than 12years old. Extended-release lamotrigine is for use only in adults and children who are at least 15years old. Lamotrigine may also be used for purposes not listed in this medication guide. What should I discuss with my healthcare provider before taking lamotrigine? You should not take lamotrigine if you are allergic to it.   Lamotrigine may cause a severe or life-threatening skin rash, especially in children and in people who take a very high starting dose, or those who also take valproic acid (Depakene) or divalproex (Depakote). Tell your doctor if you have ever had:  · a rash or allergic reaction after taking another seizure medication;  · kidney or liver disease;  · heart problems such as heart block or irregular heartbeats;  · depression, suicidal thoughts or actions; or  · meningitis (inflammation of the tissue that covers the brain and spinal cord) after taking lamotrigine. Some people have thoughts about suicide while taking lamotrigine. Your doctor will need to check your progress at regular visits. Your family or other caregivers should also be alert to changes in your mood or symptoms. Do not start or stop taking seizure medication during pregnancy without your doctor's advice. Having a seizure during pregnancy could harm both mother and baby. Tell your doctor right away if you become pregnant. If you are pregnant, your name may be listed on a pregnancy registry to track the effects of lamotrigine on the baby. Birth control pills can make lamotrigine less effective, resulting in increased seizures. Tell your doctor if you start or stop using birth control pills. Your lamotrigine dose may need to be changed. It may not be safe to breastfeed while using this medicine. Ask your doctor about any risk. How should I take lamotrigine? Follow all directions on your prescription label and read all medication guides or instruction sheets. Your doctor may occasionally change your dose. Use the medicine exactly as directed. Taking too much lamotrigine at the start of treatment may increase your risk of a severe life-threatening skin rash. You may need frequent blood tests to help your doctor make sure you are taking the right dose. Extended-release and immediate-release lamotrigine may be used for different conditions.  Always check your refills to make sure you have received the correct size, color, and shape of tablet. Avoid medication errors by using only the form and strength your doctor prescribes. If you switch to lamotrigine from another seizure medicine, carefully follow your doctor's instructions about the timing and dosage of your medicine. Swallow the tablet whole and do not crush, chew, or break it. Read and carefully follow any Instructions for Use provided with the orally disintegrating or dispersible tablets. Ask your doctor or pharmacist if you do not understand these instructions. Do not stop using lamotrigine suddenly, even if you feel fine. Stopping suddenly may cause increased seizures. Follow your doctor's instructions about tapering your dose. In case of emergency, wear or carry medical identification to let others know you use seizure medication. Lamotrigine may affect a drug-screening urine test and you may have false results. Tell the laboratory staff that you use lamotrigine. Store at room temperature away from light and moisture. What happens if I miss a dose? Take the medicine as soon as you can, but skip the missed dose if it is almost time for your next dose. Do not take two doses at one time. Get your prescription refilled before you run out of medicine completely. What happens if I overdose? Seek emergency medical attention or call the Poison Help line at 1-230.996.4734. Overdose symptoms may include blurred vision, problems with coordination, increased seizures, feeling light-headed, or fainting. What should I avoid while taking lamotrigine? Avoid driving or hazardous activity until you know how this medicine will affect you. Your reactions could be impaired. What are the possible side effects of lamotrigine?   Get emergency medical help if you have signs of an allergic reaction (hives, difficult breathing, swelling in your face or throat) or a severe skin reaction (fever, sore throat, burning eyes, skin pain, red or purple skin rash with blistering and peeling). If you have to stop taking lamotrigine because of a serious skin rash, you may not be able to take it again in the future. Report any new or worsening symptoms to your doctor, such as: mood or behavior changes, depression, anxiety, or if you feel agitated, hostile, restless, hyperactive (mentally or physically), or have thoughts about suicide or hurting yourself. Call your doctor at once if you have:  · fast, slow, or pounding heartbeats or fluttering in your chest;  · chest pain, shortness of breath;  · fever, swollen glands, weakness, severe muscle pain;  · any skin rash, especially with blistering or peeling;  · painful sores in your mouth or around your eyes;  · headache, neck stiffness, increased sensitivity to light, nausea, vomiting, confusion, drowsiness;  · jaundice (yellowing of the skin or eyes); or  · pale skin, cold hands and feet, easy bruising, unusual bleeding. Common side effects may include:  · headache, dizziness;  · blurred vision, double vision;  · tremor, loss of coordination;  · dry mouth, nausea, vomiting, stomach pain, diarrhea;  · fever, sore throat, runny nose;  · drowsiness, tired feeling;  · back pain; or  · sleep problems (insomnia). This is not a complete list of side effects and others may occur. Call your doctor for medical advice about side effects. You may report side effects to FDA at 5-593-FDA-7631. What other drugs will affect lamotrigine? Sometimes it is not safe to use certain medications at the same time. Some drugs can affect your blood levels of other drugs you take, which may increase side effects or make the medications less effective. Other drugs may affect lamotrigine, including prescription and over-the-counter medicines, vitamins, and herbal products. Tell your doctor about all your current medicines and any medicine you start or stop using. Where can I get more information? Your pharmacist can provide more information about lamotrigine.   Remember, for your use of this information.

## 2021-09-10 NOTE — PROGRESS NOTES
[facility] PHYSICIAN SERVICES  [facility]  [address] [place] [address]  [address] [address]  Dept: [phone]  Dept Fax: [phone]  Loc: [phone]    Madie French is a 24 y.o. female who presents today for her medical conditions/complaints as noted below. Madie French is c/o of Follow-up (Discuss anxiety and current medication c/o can't stay awake with medication.  )        HPI:     HPI     This 34-year-old presents today for harm follow-up. She states that she is unable to take the hydroxyzine because she just cannot stay awake with it. She does state that she has been on the [employer]  and feels that it is doing a good job of taking care of her depression. Her mom works in psychiatry however and they feel that her anxiety is getting worse even to panic attacks. She states that she would like for her to start the [address]. She states that she does see a counselor in [facility] and that he feels that most of her problem comes from PTSD. She reports that she has swings. Chief Complaint   Patient presents with    Follow-up     Discuss anxiety and current medication c/o can't stay awake with medication. Past Medical History:   Diagnosis Date    Anxiety     Depression     PTSD (post-traumatic stress disorder)       No past surgical history on file. Vitals 9/10/2021 8/13/2021 7/30/2021 9/22/2020 8/18/2020 1/66/7766   SYSTOLIC 410 [phone]   DIASTOLIC 80 76 80 74 78 80   Site - Left Upper Arm - - Left Upper Arm Right Upper Arm   Position - Sitting - - Sitting Sitting   Cuff Size - Medium Adult - - Medium Adult Medium Adult   Pulse 86 94 105 98 95 107   Temp 97.1 97.6 97.8 97.9 97.2 97.3   Resp - - 18 18 16 20   SpO2 98 98 98 98 98 98   Weight 183 lb 185 lb 189 lb 9.6 oz 169 lb 12.8 oz 174 lb 9.6 oz 190 lb   Height 5' 3\" 5' 3\" 5' 3\" 5' 3\" 5' 3\" 5' 4\"   Body mass index 32.41 kg/m2 32.77 kg/m2 33.58 kg/m2 30.08 kg/m2 30.93 kg/m2 32.61 kg/m2       No family history on file.     Social History     Tobacco Use    Smoking status: Current Every Day Smoker     Packs/day: 0.50     Years: 3.00     Pack years: 1.50     Types: Cigarettes     Start date: 2017    Smokeless tobacco: Never Used   Substance Use Topics    Alcohol use: Yes     Alcohol/week: 2.0 standard drinks     Types: 2 Glasses of wine per week     Comment: occ      Current Outpatient Medications on File Prior to Visit   Medication Sig Dispense Refill    ZAFEMY 150-35 MCG/24HR APPLY 1 PATCH TOPICALLY ONCE A WEEK AS DIRECTED      hydrocortisone 1 % cream Apply topically 2 times daily Apply topically 2 times daily.  nystatin (MYCOSTATIN) 500695 UNIT/GM powder Apply 3 times daily. 1 Bottle 1    clobetasol (TEMOVATE) 0.05 % ointment Apply topically 2 times daily. 1 Tube 1     No current facility-administered medications on file prior to visit. Allergies   Allergen Reactions    Penicillins Hives       Health Maintenance   Topic Date Due    Hepatitis C screen  Never done    Varicella vaccine (1 of 2 - 2-dose childhood series) Never done    HPV vaccine (1 - 2-dose series) Never done    COVID-19 Vaccine (1) Never done    HIV screen  Never done    Chlamydia screen  Never done    Pap smear  Never done    Flu vaccine (1) Never done    DTaP/Tdap/Td vaccine (1 - Tdap) 09/22/2021 (Originally 1/18/2018)    Pneumococcal 0-64 years Vaccine (1 of 2 - PPSV23) 09/22/2021 (Originally 1/18/2005)    Hepatitis A vaccine  Aged Out    Hepatitis B vaccine  Aged Out    Hib vaccine  Aged Out    Meningococcal (ACWY) vaccine  Aged Out       Subjective:      Review of Systems   Constitutional: Negative. HENT: Negative. Eyes: Negative. Respiratory: Negative. Cardiovascular: Negative. Gastrointestinal: Negative. Endocrine: Negative. Genitourinary: Negative. Musculoskeletal: Negative. Skin: Negative. Allergic/Immunologic: Negative. Neurological: Negative. Hematological: Negative.     Psychiatric/Behavioral: Positive for dysphoric mood and sleep disturbance. Negative for self-injury and suicidal ideas. The patient is nervous/anxious. Objective:     Physical Exam  Vitals and nursing note reviewed. Constitutional:       General: She is not in acute distress. Appearance: Normal appearance. She is not ill-appearing or toxic-appearing. HENT:      Head: Normocephalic and atraumatic. Nose: Nose normal.      Mouth/Throat:      Mouth: Mucous membranes are moist.   Eyes:      Extraocular Movements: Extraocular movements intact. Pupils: Pupils are equal, round, and reactive to light. Cardiovascular:      Rate and Rhythm: Normal rate and regular rhythm. Pulses: Normal pulses. Heart sounds: Normal heart sounds. Pulmonary:      Effort: Pulmonary effort is normal. No respiratory distress. Breath sounds: Normal breath sounds. No wheezing, rhonchi or rales. Abdominal:      General: Bowel sounds are normal.      Palpations: Abdomen is soft. Musculoskeletal:         General: Normal range of motion. Cervical back: Normal range of motion. Skin:     General: Skin is warm and dry. Coloration: Skin is not jaundiced or pale. Findings: No erythema or rash. Neurological:      Mental Status: She is alert and oriented to person, place, and time. Mental status is at baseline. Psychiatric:         Attention and Perception: Attention normal.         Mood and Affect: Mood normal.         Speech: Speech normal.         Behavior: Behavior normal. Behavior is cooperative. Thought Content: Thought content normal. Thought content does not include homicidal or suicidal ideation. Cognition and Memory: Cognition normal.         Judgment: Judgment normal.       /80   Pulse 86   Temp 97.1 °F (36.2 °C)   Ht 5' 3\" (1.6 m)   Wt 183 lb (83 kg)   SpO2 98%   BMI 32.42 kg/m²     Assessment:       Diagnosis Orders   1. Anxiety and depression     2.  PTSD (post-traumatic stress disorder)           Plan:   Patient and mother who has a history working in mental health request patient to be put on Saphris. My understanding is that they would like this to be added to her current Vraylar 6 mg daily dose. I am uncomfortable with this polypharmacy with antipsychotics for anxiety depression and PTSD. We will start patient on Lamictal 25 mg tablets take one nightly for 2 weeks then increase to two nightly for 2 weeks. Follow-up with me after that. Looking to stabilize mood and limit the extreme expressions of anxiety. She has a follow-up with her counselor in 2 weeks in [place] we will revisit this after she has this discussion with them. She was instructed to make sure to notify us of any change in thought pattern or process. She currently denies any SI or HI at this time. If her counselor is unable to handle pharmacology at this time we will consider referral to psychiatry for assistance with this. Patient given educational materials -see patient instructions. Discussed use, benefit, and side effects of prescribed medications. All patient questions answered. Pt voiced understanding. Reviewed health maintenance. Instructed to continue currentmedications, diet and exercise. Patient agreed with treatment plan. Follow up as directed. MEDICATIONS:  Orders Placed This Encounter   Medications    lamoTRIgine (LAMICTAL) 25 MG tablet     Sig: Take 1 tablet by mouth 2 times daily     Dispense:  30 tablet     Refill:  3    cariprazine hcl (VRAYLAR) 3 MG CAPS capsule     Sig: Take 2 capsules by mouth daily     Dispense:  60 capsule     Refill:  0         ORDERS:  No orders of the defined types were placed in this encounter. Follow-up:  Return in about 4 weeks (around 10/8/2021). PATIENT INSTRUCTIONS:  Patient Instructions     Patient Education        lamotrigine  Pronunciation:  ed Castro  Brand:   LaMICtal, LaMICtal ODT, LaMICtal XR, Subvenite  What is the most important information I should know about lamotrigine? Lamotrigine may cause a severe or life-threatening skin rash, especially in children and in people who take a very high starting dose, or those who also take valproic acid (Depakene) or divalproex (Depakote). Seek emergency medical attention if you have a skin rash, hives, blistering, peeling, or sores in your mouth or around your eyes. Call your doctor at once if you have signs of other serious side effects, including: fever, swollen glands, severe muscle pain, bruising or unusual bleeding, yellowing of your skin or eyes, headache, neck stiffness, vomiting, confusion, or increased sensitivity to light. Some people have thoughts about suicide while taking lamotrigine. Stay alert to changes in your mood or symptoms. Report any new or worsening symptoms to your doctor. What is lamotrigine? Lamotrigine is an anti-epileptic medication, also called an anticonvulsant. Lamotrigine is used alone or with other medications to treat epileptic seizures in adults and children. Lamotrigine is also used to delay mood episodes in adults with bipolar disorder (manic depression). Immediate-release lamotrigine can be used in children as young as 3years old when it is given as part of a combination of seizure medications. However, this form should not be used as a single medication in a child or teenager who is younger than 12years old. Extended-release lamotrigine is for use only in adults and children who are at least 15years old. Lamotrigine may also be used for purposes not listed in this medication guide. What should I discuss with my healthcare provider before taking lamotrigine? You should not take lamotrigine if you are allergic to it. Lamotrigine may cause a severe or life-threatening skin rash, especially in children and in people who take a very high starting dose, or those who also take valproic acid (Depakene) or divalproex (Depakote).   Tell your doctor if you have ever had:  · a rash or allergic reaction after taking another seizure medication;  · kidney or liver disease;  · heart problems such as heart block or irregular heartbeats;  · depression, suicidal thoughts or actions; or  · meningitis (inflammation of the tissue that covers the brain and spinal cord) after taking lamotrigine. Some people have thoughts about suicide while taking lamotrigine. Your doctor will need to check your progress at regular visits. Your family or other caregivers should also be alert to changes in your mood or symptoms. Do not start or stop taking seizure medication during pregnancy without your doctor's advice. Having a seizure during pregnancy could harm both mother and baby. Tell your doctor right away if you become pregnant. If you are pregnant, your name may be listed on a pregnancy registry to track the effects of lamotrigine on the baby. Birth control pills can make lamotrigine less effective, resulting in increased seizures. Tell your doctor if you start or stop using birth control pills. Your lamotrigine dose may need to be changed. It may not be safe to breastfeed while using this medicine. Ask your doctor about any risk. How should I take lamotrigine? Follow all directions on your prescription label and read all medication guides or instruction sheets. Your doctor may occasionally change your dose. Use the medicine exactly as directed. Taking too much lamotrigine at the start of treatment may increase your risk of a severe life-threatening skin rash. You may need frequent blood tests to help your doctor make sure you are taking the right dose. Extended-release and immediate-release lamotrigine may be used for different conditions. Always check your refills to make sure you have received the correct size, color, and shape of tablet. Avoid medication errors by using only the form and strength your doctor prescribes.   If you switch to lamotrigine from another seizure medicine, carefully follow your doctor's instructions about the timing and dosage of your medicine. Swallow the tablet whole and do not crush, chew, or break it. Read and carefully follow any Instructions for Use provided with the orally disintegrating or dispersible tablets. Ask your doctor or pharmacist if you do not understand these instructions. Do not stop using lamotrigine suddenly, even if you feel fine. Stopping suddenly may cause increased seizures. Follow your doctor's instructions about tapering your dose. In case of emergency, wear or carry medical identification to let others know you use seizure medication. Lamotrigine may affect a drug-screening urine test and you may have false results. Tell the laboratory staff that you use lamotrigine. Store at room temperature away from light and moisture. What happens if I miss a dose? Take the medicine as soon as you can, but skip the missed dose if it is almost time for your next dose. Do not take two doses at one time. Get your prescription refilled before you run out of medicine completely. What happens if I overdose? Seek emergency medical attention or call the Poison Help line at 1-174.844.2938. Overdose symptoms may include blurred vision, problems with coordination, increased seizures, feeling light-headed, or fainting. What should I avoid while taking lamotrigine? Avoid driving or hazardous activity until you know how this medicine will affect you. Your reactions could be impaired. What are the possible side effects of lamotrigine? Get emergency medical help if you have signs of an allergic reaction (hives, difficult breathing, swelling in your face or throat) or a severe skin reaction (fever, sore throat, burning eyes, skin pain, red or purple skin rash with blistering and peeling). If you have to stop taking lamotrigine because of a serious skin rash, you may not be able to take it again in the future.   Report any new or worsening symptoms to your doctor, such as: mood or behavior changes, depression, anxiety, or if you feel agitated, hostile, restless, hyperactive (mentally or physically), or have thoughts about suicide or hurting yourself. Call your doctor at once if you have:  · fast, slow, or pounding heartbeats or fluttering in your chest;  · chest pain, shortness of breath;  · fever, swollen glands, weakness, severe muscle pain;  · any skin rash, especially with blistering or peeling;  · painful sores in your mouth or around your eyes;  · headache, neck stiffness, increased sensitivity to light, nausea, vomiting, confusion, drowsiness;  · jaundice (yellowing of the skin or eyes); or  · pale skin, cold hands and feet, easy bruising, unusual bleeding. Common side effects may include:  · headache, dizziness;  · blurred vision, double vision;  · tremor, loss of coordination;  · dry mouth, nausea, vomiting, stomach pain, diarrhea;  · fever, sore throat, runny nose;  · drowsiness, tired feeling;  · back pain; or  · sleep problems (insomnia). This is not a complete list of side effects and others may occur. Call your doctor for medical advice about side effects. You may report side effects to FDA at 8-000-FDA-8690. What other drugs will affect lamotrigine? Sometimes it is not safe to use certain medications at the same time. Some drugs can affect your blood levels of other drugs you take, which may increase side effects or make the medications less effective. Other drugs may affect lamotrigine, including prescription and over-the-counter medicines, vitamins, and herbal products. Tell your doctor about all your current medicines and any medicine you start or stop using. Where can I get more information? Your pharmacist can provide more information about lamotrigine.   Remember, keep this and all other medicines out of the reach of children, never share your medicines with others, and use this medication only for the indication prescribed. Every effort has been made to ensure that the information provided by [address] is accurate, up-to-date, and complete, but no guarantee is made to that effect. Drug information contained herein may be time sensitive. [facility] information has been compiled for use by healthcare practitioners and consumers in the [place] and therefore [facility] does not warrant that uses outside of the [place] are appropriate, unless specifically indicated otherwise. [facility]'s drug information does not endorse drugs, diagnose patients or recommend therapy. [facility]'s drug information is an informational resource designed to assist licensed healthcare practitioners in caring for their patients and/or to serve consumers viewing this service as a supplement to, and not a substitute for, the expertise, skill, knowledge and judgment of healthcare practitioners. The absence of a warning for a given drug or drug combination in no way should be construed to indicate that the drug or drug combination is safe, effective or appropriate for any given patient. [facility] does not assume any responsibility for any aspect of healthcare administered with the aid of information [facility] provides. The information contained herein is not intended to cover all possible uses, directions, precautions, warnings, drug interactions, allergic reactions, or adverse effects. If you have questions about the drugs you are taking, check with your doctor, nurse or pharmacist.  Copyright 2085-7970 [address]: 20.01. Revision date: 10/19/2020. Care instructions adapted under license by [facility] ([facility]). If you have questions about a medical condition or this instruction, always ask your healthcare professional. [place] [address] any warranty or liability for your use of this information.          Electronically signed by CASSI Guzman NP on 9/10/2021 at 10:58 AM    EMR Dragon/patricia disclaimer:  Much of thisencounter note is electronic transcription/translation of spoken language to printed texts. The electronic translation of spoken language may be erroneous, or at times, nonsensical words or phrases may be inadvertentlytranscribed.   Although I have reviewed the note for such errors, some may still exist.

## 2021-10-11 ENCOUNTER — OFFICE VISIT (OUTPATIENT)
Dept: PRIMARY CARE CLINIC | Age: 21
End: 2021-10-11
Payer: MEDICAID

## 2021-10-11 VITALS
WEIGHT: 179.5 LBS | HEART RATE: 87 BPM | TEMPERATURE: 97.9 F | RESPIRATION RATE: 18 BRPM | BODY MASS INDEX: 31.8 KG/M2 | OXYGEN SATURATION: 98 % | DIASTOLIC BLOOD PRESSURE: 78 MMHG | HEIGHT: 63 IN | SYSTOLIC BLOOD PRESSURE: 114 MMHG

## 2021-10-11 DIAGNOSIS — F41.9 ANXIETY AND DEPRESSION: Primary | ICD-10-CM

## 2021-10-11 DIAGNOSIS — F43.10 PTSD (POST-TRAUMATIC STRESS DISORDER): ICD-10-CM

## 2021-10-11 DIAGNOSIS — F32.A ANXIETY AND DEPRESSION: Primary | ICD-10-CM

## 2021-10-11 PROCEDURE — G8417 CALC BMI ABV UP PARAM F/U: HCPCS | Performed by: NURSE PRACTITIONER

## 2021-10-11 PROCEDURE — G8427 DOCREV CUR MEDS BY ELIG CLIN: HCPCS | Performed by: NURSE PRACTITIONER

## 2021-10-11 PROCEDURE — 99213 OFFICE O/P EST LOW 20 MIN: CPT | Performed by: NURSE PRACTITIONER

## 2021-10-11 PROCEDURE — G8484 FLU IMMUNIZE NO ADMIN: HCPCS | Performed by: NURSE PRACTITIONER

## 2021-10-11 PROCEDURE — 4004F PT TOBACCO SCREEN RCVD TLK: CPT | Performed by: NURSE PRACTITIONER

## 2021-10-11 ASSESSMENT — ENCOUNTER SYMPTOMS
GASTROINTESTINAL NEGATIVE: 1
EYES NEGATIVE: 1
RESPIRATORY NEGATIVE: 1
ALLERGIC/IMMUNOLOGIC NEGATIVE: 1

## 2021-11-11 ENCOUNTER — OFFICE VISIT (OUTPATIENT)
Dept: PRIMARY CARE CLINIC | Age: 21
End: 2021-11-11
Payer: MEDICAID

## 2021-11-11 VITALS
BODY MASS INDEX: 31.93 KG/M2 | OXYGEN SATURATION: 98 % | DIASTOLIC BLOOD PRESSURE: 76 MMHG | TEMPERATURE: 98.4 F | HEART RATE: 95 BPM | WEIGHT: 180.2 LBS | HEIGHT: 63 IN | SYSTOLIC BLOOD PRESSURE: 120 MMHG

## 2021-11-11 DIAGNOSIS — F41.9 ANXIETY AND DEPRESSION: Primary | ICD-10-CM

## 2021-11-11 DIAGNOSIS — J06.9 URI, ACUTE: ICD-10-CM

## 2021-11-11 DIAGNOSIS — F32.A ANXIETY AND DEPRESSION: Primary | ICD-10-CM

## 2021-11-11 DIAGNOSIS — H92.02 LEFT EAR PAIN: ICD-10-CM

## 2021-11-11 PROCEDURE — G8417 CALC BMI ABV UP PARAM F/U: HCPCS | Performed by: FAMILY MEDICINE

## 2021-11-11 PROCEDURE — 4004F PT TOBACCO SCREEN RCVD TLK: CPT | Performed by: FAMILY MEDICINE

## 2021-11-11 PROCEDURE — G8427 DOCREV CUR MEDS BY ELIG CLIN: HCPCS | Performed by: FAMILY MEDICINE

## 2021-11-11 PROCEDURE — G8484 FLU IMMUNIZE NO ADMIN: HCPCS | Performed by: FAMILY MEDICINE

## 2021-11-11 PROCEDURE — 99214 OFFICE O/P EST MOD 30 MIN: CPT | Performed by: FAMILY MEDICINE

## 2021-11-11 RX ORDER — PREDNISONE 10 MG/1
TABLET ORAL
Qty: 21 TABLET | Refills: 0 | Status: SHIPPED | OUTPATIENT
Start: 2021-11-11 | End: 2022-02-08

## 2021-11-11 RX ORDER — CETIRIZINE HYDROCHLORIDE 10 MG/1
10 TABLET ORAL DAILY
Qty: 30 TABLET | Refills: 0 | Status: SHIPPED | OUTPATIENT
Start: 2021-11-11 | End: 2021-12-11

## 2021-11-11 RX ORDER — FLUTICASONE PROPIONATE 50 MCG
2 SPRAY, SUSPENSION (ML) NASAL DAILY
Qty: 16 G | Refills: 3 | Status: SHIPPED | OUTPATIENT
Start: 2021-11-11 | End: 2022-02-08

## 2021-11-11 ASSESSMENT — ENCOUNTER SYMPTOMS
WHEEZING: 0
ABDOMINAL PAIN: 0
EYE DISCHARGE: 0
BACK PAIN: 0
COLOR CHANGE: 0
NAUSEA: 0
DIARRHEA: 0
COUGH: 0
VOMITING: 0

## 2021-11-11 NOTE — PROGRESS NOTES
SUBJECTIVE:    Patient ID: Natalya Ashley is a 24 y.o. female. HPI:   Patient is seen today for 1 month follow-up on anxiety. She is currently on Lamictal and Vraylar. She is tolerating these well. She feels like they are working well for her. She states that she is not having any side effects to them. She is currently on 100 mg of Lamictal at night and is on 6 mg of Vraylar daily. She states that she feels like her mood is stable. She states that she is having some dizziness as well as some left ear pain and facial pressure. She states that she feels like she may have an inner ear infection. The symptoms have been going on for the last 7 to 10 days. She is not taking anything currently for her symptoms. Past Medical History:   Diagnosis Date    Anxiety     Depression     PTSD (post-traumatic stress disorder)       Current Outpatient Medications on File Prior to Visit   Medication Sig Dispense Refill    ZAFEMY 150-35 MCG/24HR APPLY 1 PATCH TOPICALLY ONCE A WEEK AS DIRECTED      lamoTRIgine (LAMICTAL) 25 MG tablet Take 1 tablet by mouth 2 times daily 30 tablet 3    cariprazine hcl (VRAYLAR) 3 MG CAPS capsule Take 2 capsules by mouth daily 60 capsule 0     No current facility-administered medications on file prior to visit. Allergies   Allergen Reactions    Penicillins Hives       Review of Systems   Constitutional: Negative for activity change, appetite change and fever. HENT: Positive for congestion and ear pain (left). Negative for nosebleeds. Eyes: Negative for discharge. Respiratory: Negative for cough and wheezing. Cardiovascular: Negative for chest pain and leg swelling. Gastrointestinal: Negative for abdominal pain, diarrhea, nausea and vomiting. Genitourinary: Negative for difficulty urinating, frequency and urgency. Musculoskeletal: Negative for back pain and gait problem. Skin: Negative for color change and rash.    Neurological: Positive for dizziness and headaches. Hematological: Does not bruise/bleed easily. Psychiatric/Behavioral: Negative for sleep disturbance and suicidal ideas. OBJECTIVE:    Physical Exam  Vitals reviewed. Constitutional:       General: She is not in acute distress. Appearance: Normal appearance. She is well-developed. She is not diaphoretic. HENT:      Head: Normocephalic and atraumatic. Right Ear: Tympanic membrane, ear canal and external ear normal.      Left Ear: Tympanic membrane, ear canal and external ear normal.   Cardiovascular:      Rate and Rhythm: Normal rate and regular rhythm. Pulses: Normal pulses. Heart sounds: Normal heart sounds. No murmur heard. Pulmonary:      Effort: Pulmonary effort is normal. No respiratory distress. Breath sounds: Normal breath sounds. Musculoskeletal:      Cervical back: Normal range of motion and neck supple. Skin:     General: Skin is warm and dry. Neurological:      General: No focal deficit present. Mental Status: She is alert and oriented to person, place, and time. Mental status is at baseline. Psychiatric:         Mood and Affect: Mood normal.         Behavior: Behavior normal.         Thought Content: Thought content normal.         Judgment: Judgment normal.        /76   Pulse 95   Temp 98.4 °F (36.9 °C) (Temporal)   Ht 5' 3\" (1.6 m)   Wt 180 lb 3.2 oz (81.7 kg)   SpO2 98%   BMI 31.92 kg/m²      ASSESSMENT/PLAN:    Surinder Cobb was seen today for follow-up and other. Diagnoses and all orders for this visit:    Anxiety and depression    Left ear pain    URI, acute    Other orders  -     cetirizine (ZYRTEC) 10 MG tablet; Take 1 tablet by mouth daily  -     fluticasone (FLONASE) 50 MCG/ACT nasal spray; 2 sprays by Nasal route daily  -     predniSONE (DELTASONE) 10 MG tablet;  Take 6 tablets on day 1, 5 tablets on day 2, 4 tablets on day 3, 3 tablets on day 4, 2 tablets on day 5 and 1 tablet on day 6        Problem List     Anxiety and depression - Primary            I have sent prednisone as well as Zyrtec and Flonase over the pharmacy to help with her symptoms. If she is not getting better she is to let me know. Encouraged her to drink plenty of fluids. If her ear is not better by the first part of next week discussed we would send over an antibiotic at that time but I do not feel like she needs one today. EMR Dragon/transcription disclaimer:  Much of this encounter note is electronic transcription/translation of spoken language toprinted texts. The electronic translation of spoken language may be erroneous, or at times, nonsensical words or phrases may be inadvertently transcribed.   Although I have reviewed the note for such errors, some may stillexist.

## 2022-02-08 ENCOUNTER — OFFICE VISIT (OUTPATIENT)
Dept: PRIMARY CARE CLINIC | Age: 22
End: 2022-02-08
Payer: MEDICAID

## 2022-02-08 VITALS
HEIGHT: 63 IN | BODY MASS INDEX: 31.93 KG/M2 | TEMPERATURE: 97.8 F | SYSTOLIC BLOOD PRESSURE: 115 MMHG | OXYGEN SATURATION: 98 % | WEIGHT: 180.2 LBS | DIASTOLIC BLOOD PRESSURE: 65 MMHG | HEART RATE: 117 BPM

## 2022-02-08 DIAGNOSIS — M54.50 ACUTE BILATERAL LOW BACK PAIN, UNSPECIFIED WHETHER SCIATICA PRESENT: ICD-10-CM

## 2022-02-08 DIAGNOSIS — R07.9 CHEST PAIN, UNSPECIFIED TYPE: Primary | ICD-10-CM

## 2022-02-08 DIAGNOSIS — F32.A ANXIETY AND DEPRESSION: ICD-10-CM

## 2022-02-08 DIAGNOSIS — R10.11 RUQ PAIN: ICD-10-CM

## 2022-02-08 DIAGNOSIS — R94.31 ABNORMAL EKG: ICD-10-CM

## 2022-02-08 DIAGNOSIS — R07.9 CHEST PAIN, UNSPECIFIED TYPE: ICD-10-CM

## 2022-02-08 DIAGNOSIS — F41.9 ANXIETY AND DEPRESSION: ICD-10-CM

## 2022-02-08 DIAGNOSIS — F43.10 PTSD (POST-TRAUMATIC STRESS DISORDER): ICD-10-CM

## 2022-02-08 LAB
ALBUMIN SERPL-MCNC: 4.2 G/DL (ref 3.5–5.2)
ALP BLD-CCNC: 67 U/L (ref 35–104)
ALT SERPL-CCNC: 15 U/L (ref 5–33)
ANION GAP SERPL CALCULATED.3IONS-SCNC: 14 MMOL/L (ref 7–19)
AST SERPL-CCNC: 19 U/L (ref 5–32)
BASOPHILS ABSOLUTE: 0.1 K/UL (ref 0–0.2)
BASOPHILS RELATIVE PERCENT: 0.8 % (ref 0–1)
BILIRUB SERPL-MCNC: 0.3 MG/DL (ref 0.2–1.2)
BUN BLDV-MCNC: 10 MG/DL (ref 6–20)
CALCIUM SERPL-MCNC: 9.2 MG/DL (ref 8.6–10)
CHLORIDE BLD-SCNC: 106 MMOL/L (ref 98–111)
CO2: 22 MMOL/L (ref 22–29)
CREAT SERPL-MCNC: 0.8 MG/DL (ref 0.5–0.9)
EOSINOPHILS ABSOLUTE: 0.3 K/UL (ref 0–0.6)
EOSINOPHILS RELATIVE PERCENT: 4.1 % (ref 0–5)
GFR AFRICAN AMERICAN: >59
GFR NON-AFRICAN AMERICAN: >60
GLUCOSE BLD-MCNC: 82 MG/DL (ref 74–109)
HCT VFR BLD CALC: 41.9 % (ref 37–47)
HEMOGLOBIN: 13.6 G/DL (ref 12–16)
IMMATURE GRANULOCYTES #: 0 K/UL
LIPASE: 28 U/L (ref 13–60)
LYMPHOCYTES ABSOLUTE: 2.4 K/UL (ref 1.1–4.5)
LYMPHOCYTES RELATIVE PERCENT: 30.6 % (ref 20–40)
MCH RBC QN AUTO: 27.5 PG (ref 27–31)
MCHC RBC AUTO-ENTMCNC: 32.5 G/DL (ref 33–37)
MCV RBC AUTO: 84.8 FL (ref 81–99)
MONOCYTES ABSOLUTE: 0.6 K/UL (ref 0–0.9)
MONOCYTES RELATIVE PERCENT: 8.1 % (ref 0–10)
NEUTROPHILS ABSOLUTE: 4.4 K/UL (ref 1.5–7.5)
NEUTROPHILS RELATIVE PERCENT: 56.1 % (ref 50–65)
PDW BLD-RTO: 12.8 % (ref 11.5–14.5)
PLATELET # BLD: 252 K/UL (ref 130–400)
PMV BLD AUTO: 11 FL (ref 9.4–12.3)
POTASSIUM SERPL-SCNC: 4 MMOL/L (ref 3.5–5)
RBC # BLD: 4.94 M/UL (ref 4.2–5.4)
SODIUM BLD-SCNC: 142 MMOL/L (ref 136–145)
TOTAL PROTEIN: 7.1 G/DL (ref 6.6–8.7)
TROPONIN: <0.01 NG/ML (ref 0–0.03)
WBC # BLD: 7.8 K/UL (ref 4.8–10.8)

## 2022-02-08 PROCEDURE — G8427 DOCREV CUR MEDS BY ELIG CLIN: HCPCS | Performed by: FAMILY MEDICINE

## 2022-02-08 PROCEDURE — 81002 URINALYSIS NONAUTO W/O SCOPE: CPT | Performed by: FAMILY MEDICINE

## 2022-02-08 PROCEDURE — 93000 ELECTROCARDIOGRAM COMPLETE: CPT | Performed by: FAMILY MEDICINE

## 2022-02-08 PROCEDURE — G8484 FLU IMMUNIZE NO ADMIN: HCPCS | Performed by: FAMILY MEDICINE

## 2022-02-08 PROCEDURE — 99214 OFFICE O/P EST MOD 30 MIN: CPT | Performed by: FAMILY MEDICINE

## 2022-02-08 PROCEDURE — G8417 CALC BMI ABV UP PARAM F/U: HCPCS | Performed by: FAMILY MEDICINE

## 2022-02-08 PROCEDURE — 4004F PT TOBACCO SCREEN RCVD TLK: CPT | Performed by: FAMILY MEDICINE

## 2022-02-08 RX ORDER — BUSPIRONE HYDROCHLORIDE 5 MG/1
5 TABLET ORAL 3 TIMES DAILY
Qty: 90 TABLET | Refills: 2 | Status: SHIPPED | OUTPATIENT
Start: 2022-02-08 | End: 2022-03-10

## 2022-02-08 ASSESSMENT — ENCOUNTER SYMPTOMS
VOMITING: 0
ABDOMINAL PAIN: 0
COUGH: 0
COLOR CHANGE: 0
SHORTNESS OF BREATH: 1
DIARRHEA: 0
NAUSEA: 0
CHEST TIGHTNESS: 1
WHEEZING: 0
EYE DISCHARGE: 0
BACK PAIN: 0

## 2022-02-08 ASSESSMENT — PATIENT HEALTH QUESTIONNAIRE - PHQ9
6. FEELING BAD ABOUT YOURSELF - OR THAT YOU ARE A FAILURE OR HAVE LET YOURSELF OR YOUR FAMILY DOWN: 1
SUM OF ALL RESPONSES TO PHQ QUESTIONS 1-9: 14
2. FEELING DOWN, DEPRESSED OR HOPELESS: 2
9. THOUGHTS THAT YOU WOULD BE BETTER OFF DEAD, OR OF HURTING YOURSELF: 0
10. IF YOU CHECKED OFF ANY PROBLEMS, HOW DIFFICULT HAVE THESE PROBLEMS MADE IT FOR YOU TO DO YOUR WORK, TAKE CARE OF THINGS AT HOME, OR GET ALONG WITH OTHER PEOPLE: 2
SUM OF ALL RESPONSES TO PHQ QUESTIONS 1-9: 14
SUM OF ALL RESPONSES TO PHQ QUESTIONS 1-9: 14
1. LITTLE INTEREST OR PLEASURE IN DOING THINGS: 2
5. POOR APPETITE OR OVEREATING: 1
7. TROUBLE CONCENTRATING ON THINGS, SUCH AS READING THE NEWSPAPER OR WATCHING TELEVISION: 0
3. TROUBLE FALLING OR STAYING ASLEEP: 3
SUM OF ALL RESPONSES TO PHQ QUESTIONS 1-9: 14
4. FEELING TIRED OR HAVING LITTLE ENERGY: 3
SUM OF ALL RESPONSES TO PHQ9 QUESTIONS 1 & 2: 4
8. MOVING OR SPEAKING SO SLOWLY THAT OTHER PEOPLE COULD HAVE NOTICED. OR THE OPPOSITE, BEING SO FIGETY OR RESTLESS THAT YOU HAVE BEEN MOVING AROUND A LOT MORE THAN USUAL: 2

## 2022-02-08 NOTE — PROGRESS NOTES
SUBJECTIVE:    Patient ID: Yazmin Prabhakar is a 20 y.o. female. HPI:   Patient is seen today for a 3-month follow-up. She states that she is complaining of lower back pain. She states that it is in more in the kidney area. She states that it is bilaterally. She states she has had a history of urinary tract infections before. She states that she feels like she may have a urinary tract infection again. She denies any frequency or burning. She states that she has not noticed any odor or blood. She states she is also having chest pain. She states that she notices it worse when she lays down at night. She states that she is occasionally having some palpitations as well. She describes it as a tightness. She states that the pain will also send a \"tingling sensation in her chest.  She states that whenever the pain subsides that she does get abdominal cramping and does have diarrhea. She states that she is struggling with fatigue. She states that she has trouble staying awake. She states that she does feel like she is down. She is currently on Vraylar 3 mg. She is taking this in the mornings currently. She is not taking the Lamictal because she felt like that made her headaches significantly worse. She does feel like the Shannan Presley is helping but she states that she does still feel somewhat down that she thinks that is because she is so fatigued. She is still struggling with anxiety and what she describes as panic attacks. She would like something that she can add onto the Vraylar to take for panic attacks if possible. She states that she has not been on anything recently for anxiety however she has taken BuSpar in the past but is not sure how she did with it.     Past Medical History:   Diagnosis Date    Anxiety     Depression     PTSD (post-traumatic stress disorder)       Current Outpatient Medications on File Prior to Visit   Medication Sig Dispense Refill    ZAFEMY 150-35 MCG/24HR APPLY 1 person, place, and time. Mental status is at baseline. Psychiatric:         Mood and Affect: Mood normal.         Behavior: Behavior normal.         Thought Content: Thought content normal.         Judgment: Judgment normal.        /65   Pulse 117   Temp 97.8 °F (36.6 °C)   Ht 5' 2.5\" (1.588 m)   Wt 180 lb 3.2 oz (81.7 kg)   LMP 02/08/2022   SpO2 98%   BMI 32.43 kg/m²      ASSESSMENT/PLAN:    1. Chest pain, unspecified type  -     EKG 12 Lead  -     CBC Auto Differential; Future  -     Comprehensive Metabolic Panel; Future  -     Lipase; Future  -     Troponin; Future  2. Anxiety and depression  3. PTSD (post-traumatic stress disorder)  4. RUQ pain  -     CBC Auto Differential; Future  -     Comprehensive Metabolic Panel; Future  -     Lipase; Future  -     US GALLBLADDER RUQ; Future  5. Acute bilateral low back pain, unspecified whether sciatica present  -     POCT Urinalysis no Micro  6. Abnormal EKG  -     Troponin; Future       EKG did show some slight abnormality. I did a send her EKG to Dr. Kathy Polk to have her worked up. I did not see anything acute going on today. I did go ahead and draw a troponin on her as well as labs including a pancreas enzyme. I am going to get a gallbladder ultrasound. We will notify her of the results of that. Discussed that the symptoms that she is having could be gallbladder related. We are going to get a urinalysis on her today due to her lower back pain. We will notify her of the results of that as well. Of encouraged her to make sure she is drinking plenty of fluids. I did send BuSpar over to the pharmacy to help with her anxiety. We are going to add this to the Doree Venessa which she takes for anxiety and depression. She is seeing a therapist and I encouraged her to continue to follow with them.   I would like to see her back in 4 weeks to see how she is doing with the anxiety and to go over all of the results that we have gotten from testing from today and tied any loose ends together. I discussed that if the chest pain is worsening it is not going away she is to go to the ER. PDMP Monitoring:    Last PDMP [place] as Reviewed [facility]):  Review User Review Instant Review Result            Urine Drug Screenings (1 yr)    No resulted procedures found. Medication Contract and Consent for Opioid Use Documents Filed      No documents found                 EMR Dragon/transcription disclaimer:  Much of this encounter note is electronic transcription/translation of spoken language toprinted texts. The electronic translation of spoken language may be erroneous, or at times, nonsensical words or phrases may be inadvertently transcribed.   Although I have reviewed the note for such errors, some may stillexist.

## 2022-02-28 ENCOUNTER — OFFICE VISIT (OUTPATIENT)
Dept: CARDIOLOGY CLINIC | Age: 22
End: 2022-02-28
Payer: MEDICAID

## 2022-02-28 VITALS
BODY MASS INDEX: 31.54 KG/M2 | HEART RATE: 85 BPM | SYSTOLIC BLOOD PRESSURE: 134 MMHG | DIASTOLIC BLOOD PRESSURE: 88 MMHG | HEIGHT: 63 IN | WEIGHT: 178 LBS

## 2022-02-28 DIAGNOSIS — R07.9 CHEST PAIN, UNSPECIFIED TYPE: ICD-10-CM

## 2022-02-28 DIAGNOSIS — R94.31 ABNORMAL EKG: Primary | ICD-10-CM

## 2022-02-28 PROCEDURE — 99204 OFFICE O/P NEW MOD 45 MIN: CPT | Performed by: INTERNAL MEDICINE

## 2022-02-28 PROCEDURE — 93000 ELECTROCARDIOGRAM COMPLETE: CPT | Performed by: INTERNAL MEDICINE

## 2022-02-28 PROCEDURE — G8484 FLU IMMUNIZE NO ADMIN: HCPCS | Performed by: INTERNAL MEDICINE

## 2022-02-28 PROCEDURE — G8427 DOCREV CUR MEDS BY ELIG CLIN: HCPCS | Performed by: INTERNAL MEDICINE

## 2022-02-28 PROCEDURE — 4004F PT TOBACCO SCREEN RCVD TLK: CPT | Performed by: INTERNAL MEDICINE

## 2022-02-28 PROCEDURE — G8417 CALC BMI ABV UP PARAM F/U: HCPCS | Performed by: INTERNAL MEDICINE

## 2022-02-28 RX ORDER — AMLODIPINE BESYLATE 5 MG/1
2.5 TABLET ORAL DAILY
Qty: 15 TABLET | Refills: 0 | Status: SHIPPED | OUTPATIENT
Start: 2022-02-28 | End: 2022-02-28 | Stop reason: DRUGHIGH

## 2022-02-28 RX ORDER — AMLODIPINE BESYLATE 2.5 MG/1
2.5 TABLET ORAL DAILY
Qty: 30 TABLET | Refills: 0 | Status: SHIPPED | OUTPATIENT
Start: 2022-02-28 | End: 2022-04-01 | Stop reason: SDUPTHER

## 2022-02-28 ASSESSMENT — ENCOUNTER SYMPTOMS
EYE DISCHARGE: 0
BLOOD IN STOOL: 0
CONSTIPATION: 0
WHEEZING: 0
ABDOMINAL PAIN: 0
VOMITING: 0
APNEA: 0
SHORTNESS OF BREATH: 0
ABDOMINAL DISTENTION: 0
EYE PAIN: 0
EYE REDNESS: 0
DIARRHEA: 0
SORE THROAT: 0
CHEST TIGHTNESS: 0
FACIAL SWELLING: 0
NAUSEA: 0
COUGH: 0

## 2022-02-28 NOTE — PROGRESS NOTES
Cardiology Office Visit Note  [address]  [id number]  Phone: [phone]  Fax: [phone]                            Date:  2/28/2022  Patient: Rolando Dillon  Age:  20 y. o., 2000    Referral: Santana Britt*    REASON FOR VISIT:  Chest pain      PROBLEM LIST:    Patient Active Problem List    Diagnosis Date Noted    PTSD (post-traumatic stress disorder) 09/10/2021     Priority: Low    Anxiety and depression 07/30/2021     Priority: Low         PRESENTATION: Rolando Dillon is a 20y.o. year old female is seen today in cardiology consultation for further evaluation of intermittent chest pain. She has no known history of established cardiac disease. Unfortunately she smokes half a pack of cigarettes per day and has done so for the past 2 years. She denies a family history of premature coronary artery disease. She notes a history of preeclampsia, not needing medications for blood pressure at the time (5 years ago). Since initial diagnosis of preeclampsia she has however noted her blood pressure fluctuating up into the mild to moderate hypertensive range. Her other history notable for anxiety and depression as well as posttraumatic stress disorder. She complains of central chest tightness described as feeling a punch in her chest.  Chest pain usually sudden in onset lasting for few minutes up to longest approximately 30 minutes. No associated shortness of breath, nausea, vomiting, palpitations, cough or sputum production. She does occasionally experience lightheadedness and dizziness without syncope. She is also describe lower abdominal pain. She recently had a gallbladder ultrasound however the results were not available to me at the time of this dictation. REVIEW OF SYSTEMS:  Review of Systems   Constitutional: Negative for chills, fatigue and fever. HENT: Negative for congestion, facial swelling, hearing loss and sore throat.     Eyes: Negative for pain, discharge, redness and visual disturbance. Respiratory: Negative for apnea, cough, chest tightness, shortness of breath and wheezing. Cardiovascular: Positive for chest pain. Negative for palpitations and leg swelling. Gastrointestinal: Negative for abdominal distention, abdominal pain, blood in stool, constipation, diarrhea, nausea and vomiting. Endocrine: Negative for polydipsia, polyphagia and polyuria. Genitourinary: Negative for dysuria, flank pain, frequency and hematuria. Musculoskeletal: Negative for joint swelling, myalgias and neck pain. Skin: Negative for pallor and rash. Neurological: Positive for light-headedness. Negative for dizziness, syncope, speech difficulty, numbness and headaches. Psychiatric/Behavioral: Negative for confusion, hallucinations and sleep disturbance. Past Medical History:      Diagnosis Date    Anxiety     Depression     PTSD (post-traumatic stress disorder)        Past Surgical History:  History reviewed. No pertinent surgical history. Medications:  Current Outpatient Medications   Medication Sig Dispense Refill    amLODIPine (NORVASC) 5 MG tablet Take 0.5 tablets by mouth daily 15 tablet 0    busPIRone (BUSPAR) 5 MG tablet Take 1 tablet by mouth 3 times daily 90 tablet 2    cariprazine hcl (VRAYLAR) 3 MG CAPS capsule Take 2 capsules by mouth daily 60 capsule 0    ZAFEMY 150-35 MCG/24HR APPLY 1 PATCH TOPICALLY ONCE A WEEK AS DIRECTED       No current facility-administered medications for this visit.        Allergies:  Penicillins    Social History:  Social History     Occupational History    Not on file   Tobacco Use    Smoking status: Current Every Day Smoker     Packs/day: 0.50     Years: 3.00     Pack years: 1.50     Types: Cigarettes     Start date: 2017    Smokeless tobacco: Never Used   Vaping Use    Vaping Use: Never used   Substance and Sexual Activity    Alcohol use: Yes     Comment: occ    Drug use: Never    Sexual activity: Yes     Partners: Male     Birth control/protection: Patch         Family History:   History reviewed. No pertinent family history. Physical Examination:  /88 (Site: Left Upper Arm, Position: Sitting)   Pulse 85   Ht 5' 3\" (1.6 m)   Wt 178 lb (80.7 kg)   LMP 02/08/2022   BMI 31.53 kg/m²   Physical Exam  Vitals reviewed. Constitutional:       General: She is not in acute distress. Appearance: Normal appearance. She is not ill-appearing, toxic-appearing or diaphoretic. HENT:      Head: Normocephalic and atraumatic. Eyes:      General: No scleral icterus. Right eye: No discharge. Left eye: No discharge. Conjunctiva/sclera: Conjunctivae normal.   Neck:      Vascular: No carotid bruit. Cardiovascular:      Rate and Rhythm: Normal rate and regular rhythm. No extrasystoles are present. Chest Wall: PMI is not displaced. No thrill. Heart sounds: S1 normal and S2 normal. No murmur heard. No friction rub. No gallop. Pulmonary:      Effort: Pulmonary effort is normal. No tachypnea or respiratory distress. Breath sounds: Normal breath sounds. No stridor. No wheezing, rhonchi or rales. Chest:      Chest wall: No tenderness. Abdominal:      General: Bowel sounds are normal. There is no distension. Palpations: Abdomen is soft. There is no mass. Tenderness: There is no abdominal tenderness. There is no guarding. Musculoskeletal:         General: No swelling. Cervical back: Normal range of motion and neck supple. No rigidity. Right lower leg: No edema. Left lower leg: No edema. Skin:     General: Skin is warm and dry. Coloration: Skin is not jaundiced. Findings: No erythema or rash. Neurological:      General: No focal deficit present. Mental Status: She is alert and oriented to person, place, and time. Mental status is at baseline.    Psychiatric:         Mood and Affect: Mood normal.         Behavior: Behavior normal.         Thought Content: Thought content normal.           Labs:   CBC: No results found for: CBC   BMP: No results found for: BMP    BNP: No results found for: BNPINT   PT/INR: No results found for: PROTIME, INR, INR    APTT:  No results found for: APTT   CARDIAC ENZYMES:   Troponin   Date Value Ref Range Status   02/08/2022 <0.01 0.00 - 0.03 ng/mL Final     Comment:     <0.030 ng/ml       No measurable cardiac damage. 0.030-0.099 ng/ml  Values of troponin in this range suggest  possible myocardial damage. Repeat assay  4 to 6 hours after the current specimen.    >= 0.100 ng/ml     Indicative of myocardial damage. Recommend continued monitoring of  patient status and cardiac markers. LIPID PANEL: No results found for: Hemanth Burgess  LIVER PROFILE:   AST   Date Value Ref Range Status   02/08/2022 19 5 - 32 U/L Final     ALT   Date Value Ref Range Status   02/08/2022 15 5 - 33 U/L Final     Albumin   Date Value Ref Range Status   02/08/2022 4.2 3.5 - 5.2 g/dL Final              Imaging:    - EKG : Normal sinus rhythm 85 bpm.  Normal study. ASSESSMENT and PLAN:    Acute on chronic atypical chest pain. Strongly doubt atherosclerotic disease in young patient however cannot absolutely exclude small vessel disease or vasospastic angina. History of preeclampsia  Abnormal blood pressure, not formally diagnosed with hypertension  Anxiety/ depression  Cigarette smoking    2D echocardiogram with Doppler to exclude structural abnormalities  Plain treadmill stress test to exclude stress-induced ischemia. Trial of low-dose amlodipine 2.5 mg p.o. daily  Continue home blood pressure monitoring  Cigarette smoking cessation counseling. Patient declined smoking cessation aids. Follow-up results of gallbladder ultrasound  Further recommendations will follow            Return in about 4 weeks (around 3/28/2022).       Electronically signed by Kenneth Orr MD on 2/28/2022 at 8:36 AM    Kenneth Orr PIPER CURIEL, [facility]  Noninvasive Cardiology Consultant    This dictation was generated by voice recognition computer software. Although all attempts are made to edit the dictation for accuracy, there may be errors in the transcription that are not intended.

## 2022-03-08 ENCOUNTER — OFFICE VISIT (OUTPATIENT)
Dept: PRIMARY CARE CLINIC | Age: 22
End: 2022-03-08
Payer: MEDICAID

## 2022-03-08 VITALS
RESPIRATION RATE: 16 BRPM | TEMPERATURE: 96.6 F | DIASTOLIC BLOOD PRESSURE: 86 MMHG | OXYGEN SATURATION: 99 % | SYSTOLIC BLOOD PRESSURE: 124 MMHG | HEART RATE: 74 BPM | WEIGHT: 179 LBS | BODY MASS INDEX: 31.71 KG/M2 | HEIGHT: 63 IN

## 2022-03-08 DIAGNOSIS — F41.9 ANXIETY AND DEPRESSION: ICD-10-CM

## 2022-03-08 DIAGNOSIS — R07.9 CHEST PAIN, UNSPECIFIED TYPE: ICD-10-CM

## 2022-03-08 DIAGNOSIS — R94.31 ABNORMAL EKG: ICD-10-CM

## 2022-03-08 DIAGNOSIS — F32.A ANXIETY AND DEPRESSION: ICD-10-CM

## 2022-03-08 DIAGNOSIS — F41.9 ANXIETY: Primary | ICD-10-CM

## 2022-03-08 DIAGNOSIS — R53.83 FATIGUE, UNSPECIFIED TYPE: ICD-10-CM

## 2022-03-08 PROCEDURE — G8484 FLU IMMUNIZE NO ADMIN: HCPCS | Performed by: FAMILY MEDICINE

## 2022-03-08 PROCEDURE — 99213 OFFICE O/P EST LOW 20 MIN: CPT | Performed by: FAMILY MEDICINE

## 2022-03-08 PROCEDURE — G8427 DOCREV CUR MEDS BY ELIG CLIN: HCPCS | Performed by: FAMILY MEDICINE

## 2022-03-08 PROCEDURE — G8417 CALC BMI ABV UP PARAM F/U: HCPCS | Performed by: FAMILY MEDICINE

## 2022-03-08 PROCEDURE — 4004F PT TOBACCO SCREEN RCVD TLK: CPT | Performed by: FAMILY MEDICINE

## 2022-03-08 ASSESSMENT — ENCOUNTER SYMPTOMS
ABDOMINAL PAIN: 0
COLOR CHANGE: 0
VOMITING: 0
BACK PAIN: 0
WHEEZING: 0
EYE DISCHARGE: 0
DIARRHEA: 0
COUGH: 0
NAUSEA: 0

## 2022-03-08 NOTE — PROGRESS NOTES
SUBJECTIVE:    Patient ID: Donny Chang is a 20 y.o. female. HPI:   Patient is seen today for follow-up after she was seen recently for chest pain and abnormal EKG. She is currently seeing cardiology and I have her scheduled for an echocardiogram and a stress test.  She states that she is still having episodes of the chest pain that feels like they have gotten a little better. She states that she started her on amlodipine 2.5 mg and she does feel like this is helped some. She is tolerating it well and denies any side effects to it. She is also taking Vraylar 6 mg and BuSpar for anxiety and depression. She is tolerating these well. She states that she still is tired and fatigued a lot. She states that she just does not have much motivation to do things. She states that she is worn out all the time. She states that the fatigue has been going on for a long time but she does feel like it may have gotten a little worse since being on the higher dose of the Vraylar. Past Medical History:   Diagnosis Date    Anxiety     Depression     PTSD (post-traumatic stress disorder)       Current Outpatient Medications on File Prior to Visit   Medication Sig Dispense Refill    amLODIPine (NORVASC) 2.5 MG tablet Take 1 tablet by mouth daily 30 tablet 0    busPIRone (BUSPAR) 5 MG tablet Take 1 tablet by mouth 3 times daily 90 tablet 2    cariprazine hcl (VRAYLAR) 3 MG CAPS capsule Take 2 capsules by mouth daily 60 capsule 0    ZAFEMY 150-35 MCG/24HR APPLY 1 PATCH TOPICALLY ONCE A WEEK AS DIRECTED       No current facility-administered medications on file prior to visit. Allergies   Allergen Reactions    Penicillins Hives       Review of Systems   Constitutional: Positive for fatigue. Negative for activity change, appetite change and fever. HENT: Negative for congestion and nosebleeds. Eyes: Negative for discharge. Respiratory: Negative for cough and wheezing.     Cardiovascular: Negative for chest pain and leg swelling. Gastrointestinal: Negative for abdominal pain, diarrhea, nausea and vomiting. Genitourinary: Negative for difficulty urinating, frequency and urgency. Musculoskeletal: Negative for back pain and gait problem. Skin: Negative for color change and rash. Neurological: Negative for dizziness and headaches. Hematological: Does not bruise/bleed easily. Psychiatric/Behavioral: Negative for sleep disturbance and suicidal ideas. OBJECTIVE:    Physical Exam  Vitals reviewed. Constitutional:       General: She is not in acute distress. Appearance: Normal appearance. She is well-developed. She is not diaphoretic. HENT:      Head: Normocephalic and atraumatic. Right Ear: External ear normal.      Left Ear: External ear normal.   Cardiovascular:      Rate and Rhythm: Normal rate and regular rhythm. Pulses: Normal pulses. Heart sounds: Normal heart sounds. No murmur heard. Pulmonary:      Effort: Pulmonary effort is normal. No respiratory distress. Breath sounds: Normal breath sounds. Musculoskeletal:      Cervical back: Normal range of motion and neck supple. Skin:     General: Skin is warm and dry. Neurological:      General: No focal deficit present. Mental Status: She is alert and oriented to person, place, and time. Mental status is at baseline. Psychiatric:         Mood and Affect: Mood normal.         Behavior: Behavior normal.         Thought Content: Thought content normal.         Judgment: Judgment normal.        /86 (Site: Left Upper Arm, Position: Sitting, Cuff Size: Medium Adult)   Pulse 74   Temp 96.6 °F (35.9 °C) (Temporal)   Resp 16   Ht 5' 3\" (1.6 m)   Wt 179 lb (81.2 kg)   LMP 02/08/2022   SpO2 99%   BMI 31.71 kg/m²      ASSESSMENT/PLAN:    1. Anxiety  2. Fatigue, unspecified type  3. Anxiety and depression  4. Abnormal EKG  5.  Chest pain, unspecified type       Continue Vraylar and BuSpar for anxiety and depression. Discussed that the Vraylar 6 mg may be causing her some fatigue. Discussed that if the fatigue is not improving she is to let us know. Continue to follow with cardiology regarding the abnormal EKG and chest pain. PDMP Monitoring:    Last PDMP [place] as Reviewed [facility]):  Review User Review Instant Review Result            Urine Drug Screenings (1 yr)    No resulted procedures found. Medication Contract and Consent for Opioid Use Documents Filed      No documents found                 EMR Dragon/transcription disclaimer:  Much of this encounter note is electronic transcription/translation of spoken language toprinted texts. The electronic translation of spoken language may be erroneous, or at times, nonsensical words or phrases may be inadvertently transcribed.   Although I have reviewed the note for such errors, some may stillexist.

## 2022-03-11 DIAGNOSIS — R10.11 RUQ PAIN: ICD-10-CM

## 2022-03-17 ENCOUNTER — HOSPITAL ENCOUNTER (OUTPATIENT)
Dept: NON INVASIVE DIAGNOSTICS | Age: 22
Discharge: HOME | End: 2022-03-17
Payer: MEDICAID

## 2022-03-17 DIAGNOSIS — R07.9 CHEST PAIN, UNSPECIFIED TYPE: ICD-10-CM

## 2022-03-17 LAB
LV EF: 58 %
LVEF MODALITY: NORMAL

## 2022-03-17 PROCEDURE — 93017 CV STRESS TEST TRACING ONLY: CPT

## 2022-03-17 PROCEDURE — 93306 TTE W/DOPPLER COMPLETE: CPT

## 2022-03-22 ENCOUNTER — TELEPHONE (OUTPATIENT)
Dept: CARDIOLOGY CLINIC | Age: 22
End: 2022-03-22

## 2022-03-22 NOTE — TELEPHONE ENCOUNTER
----- Message from Niurka Caban MD sent at 3/18/2022  8:58 AM CDT -----  Echocardiogram is reassuring excluding significant structural heart disease.

## 2022-03-25 NOTE — RESULT ENCOUNTER NOTE
Excellent exercise capacity. No evidence for blood supply/demand mismatch (no ischemia). Blood pressure at peak exercise was abnormal; systolic blood pressure 171. Recently started on amlodipine. Follow-up in 4 to 6 weeks from last office visit.

## 2022-03-28 ENCOUNTER — TELEPHONE (OUTPATIENT)
Dept: CARDIOLOGY CLINIC | Age: 22
End: 2022-03-28

## 2022-03-28 NOTE — TELEPHONE ENCOUNTER
----- Message from Kacey Shi MD sent at 3/25/2022 12:21 PM CDT -----  Excellent exercise capacity. No evidence for blood supply/demand mismatch (no ischemia). Blood pressure at peak exercise was abnormal; systolic blood pressure 491. Recently started on amlodipine. Follow-up in 4 to 6 weeks from last office visit.

## 2022-03-28 NOTE — TELEPHONE ENCOUNTER
Spoke with patient and results given per Dr. Allen Liner instruction. Patient was previously scheduled for 04/01/22.

## 2022-04-01 ENCOUNTER — OFFICE VISIT (OUTPATIENT)
Dept: CARDIOLOGY CLINIC | Age: 22
End: 2022-04-01
Payer: MEDICAID

## 2022-04-01 VITALS
DIASTOLIC BLOOD PRESSURE: 84 MMHG | HEIGHT: 63 IN | OXYGEN SATURATION: 99 % | BODY MASS INDEX: 32.25 KG/M2 | HEART RATE: 50 BPM | WEIGHT: 182 LBS | SYSTOLIC BLOOD PRESSURE: 108 MMHG

## 2022-04-01 DIAGNOSIS — I10 PRIMARY HYPERTENSION: Primary | ICD-10-CM

## 2022-04-01 PROCEDURE — G8417 CALC BMI ABV UP PARAM F/U: HCPCS | Performed by: INTERNAL MEDICINE

## 2022-04-01 PROCEDURE — 99214 OFFICE O/P EST MOD 30 MIN: CPT | Performed by: INTERNAL MEDICINE

## 2022-04-01 PROCEDURE — 4004F PT TOBACCO SCREEN RCVD TLK: CPT | Performed by: INTERNAL MEDICINE

## 2022-04-01 PROCEDURE — G8427 DOCREV CUR MEDS BY ELIG CLIN: HCPCS | Performed by: INTERNAL MEDICINE

## 2022-04-01 RX ORDER — AMLODIPINE BESYLATE 2.5 MG/1
2.5 TABLET ORAL DAILY
Qty: 30 TABLET | Refills: 0 | Status: SHIPPED | OUTPATIENT
Start: 2022-04-01 | End: 2022-10-03 | Stop reason: SDUPTHER

## 2022-04-01 ASSESSMENT — ENCOUNTER SYMPTOMS
WHEEZING: 0
BLOOD IN STOOL: 0
COUGH: 0
EYE DISCHARGE: 0
EYE REDNESS: 0
SHORTNESS OF BREATH: 1
CONSTIPATION: 0
DIARRHEA: 0
FACIAL SWELLING: 0
ABDOMINAL DISTENTION: 0
CHEST TIGHTNESS: 0
SORE THROAT: 0
VOMITING: 0
APNEA: 0
NAUSEA: 0
EYE PAIN: 0
ABDOMINAL PAIN: 0

## 2022-04-01 NOTE — PROGRESS NOTES
Gastrointestinal: Negative for abdominal distention, abdominal pain, blood in stool, constipation, diarrhea, nausea and vomiting. Endocrine: Negative for polydipsia, polyphagia and polyuria. Genitourinary: Negative for dysuria, flank pain, frequency and hematuria. Musculoskeletal: Negative for joint swelling, myalgias and neck pain. Skin: Negative for pallor and rash. Neurological: Negative for dizziness, syncope, speech difficulty, light-headedness, numbness and headaches. Psychiatric/Behavioral: Negative for confusion, hallucinations and sleep disturbance. Past Medical History:      Diagnosis Date    Anxiety     Depression     PTSD (post-traumatic stress disorder)        Past Surgical History:  History reviewed. No pertinent surgical history. Medications:  Current Outpatient Medications   Medication Sig Dispense Refill    amLODIPine (NORVASC) 2.5 MG tablet Take 1 tablet by mouth daily 30 tablet 0    cariprazine hcl (VRAYLAR) 3 MG CAPS capsule Take 2 capsules by mouth daily 60 capsule 0    ZAFEMY 150-35 MCG/24HR APPLY 1 PATCH TOPICALLY ONCE A WEEK AS DIRECTED       No current facility-administered medications for this visit. Allergies:  Penicillins    Social History:  Social History     Occupational History    Not on file   Tobacco Use    Smoking status: Current Every Day Smoker     Packs/day: 0.50     Years: 3.00     Pack years: 1.50     Types: Cigarettes     Start date: 2017    Smokeless tobacco: Never Used   Vaping Use    Vaping Use: Never used   Substance and Sexual Activity    Alcohol use: Yes     Comment: occ    Drug use: Never    Sexual activity: Yes     Partners: Male     Birth control/protection: Patch         Family History:   History reviewed. No pertinent family history. Physical Examination:  /84   Pulse 50   Ht 5' 3\" (1.6 m)   Wt 182 lb (82.6 kg)   SpO2 99%   BMI 32.24 kg/m²   Physical Exam  Vitals reviewed.    Constitutional:       General: She is not in acute distress. Appearance: Normal appearance. She is not ill-appearing, toxic-appearing or diaphoretic. HENT:      Head: Normocephalic and atraumatic. Eyes:      General: No scleral icterus. Right eye: No discharge. Left eye: No discharge. Conjunctiva/sclera: Conjunctivae normal.   Neck:      Vascular: No carotid bruit. Cardiovascular:      Rate and Rhythm: Normal rate and regular rhythm. No extrasystoles are present. Chest Wall: PMI is not displaced. No thrill. Heart sounds: S1 normal and S2 normal. No murmur heard. No friction rub. No gallop. Pulmonary:      Effort: Pulmonary effort is normal. No tachypnea or respiratory distress. Breath sounds: Normal breath sounds. No stridor. No wheezing, rhonchi or rales. Chest:      Chest wall: No tenderness. Abdominal:      General: Bowel sounds are normal. There is no distension. Palpations: Abdomen is soft. There is no mass. Tenderness: There is no abdominal tenderness. There is no guarding. Musculoskeletal:         General: No swelling. Cervical back: Normal range of motion and neck supple. No rigidity. Right lower leg: No edema. Left lower leg: No edema. Skin:     General: Skin is warm and dry. Coloration: Skin is not jaundiced. Findings: No erythema or rash. Neurological:      General: No focal deficit present. Mental Status: She is alert and oriented to person, place, and time. Mental status is at baseline. Psychiatric:         Mood and Affect: Mood normal.         Behavior: Behavior normal.         Thought Content:  Thought content normal.           Labs:   CBC: No results found for: CBC   BMP: No results found for: BMP    BNP: No results found for: BNPINT   PT/INR: No results found for: PROTIME, INR, INR    APTT:  No results found for: APTT   CARDIAC ENZYMES:   Troponin   Date Value Ref Range Status   02/08/2022 <0.01 0.00 - 0.03 ng/mL Final     Comment: <0.030 ng/ml       No measurable cardiac damage. 0.030-0.099 ng/ml  Values of troponin in this range suggest  possible myocardial damage. Repeat assay  4 to 6 hours after the current specimen.    >= 0.100 ng/ml     Indicative of myocardial damage. Recommend continued monitoring of  patient status and cardiac markers. LIPID PANEL: No results found for: LIPIDPAN  LIVER PROFILE:   AST   Date Value Ref Range Status   02/08/2022 19 5 - 32 U/L Final     ALT   Date Value Ref Range Status   02/08/2022 15 5 - 33 U/L Final     Albumin   Date Value Ref Range Status   02/08/2022 4.2 3.5 - 5.2 g/dL Final              ASSESSMENT and PLAN:    Essential hypertension, goal blood pressure less than 130/80, currently controlled  > History of preeclampsia  > No evidence for structural heart disease on echocardiogram  > Lab analysis including renal function and TSH within normal limits    Blood pressure currently at goal on low-dose amlodipine. We will continue conservative medical management. Gradual and progressive exercise to improve conditioning. Low-sodium diet, less than 2 g per 24 hours. Home blood pressure monitoring. Patient asked to follow-up with office via MyChart/phone in approximately 1 month for further BP trend and symptom analysis. Return in about 6 months (around 10/1/2022). Electronically signed by Isaura Wang MD on 4/1/2022 at 9:09 AM    Isaura Wang MD, PIPER, [facility]  Noninvasive Cardiology Consultant    This dictation was generated by voice recognition computer software. Although all attempts are made to edit the dictation for accuracy, there may be errors in the transcription that are not intended.

## 2022-05-02 ENCOUNTER — TELEPHONE (OUTPATIENT)
Dept: CARDIOLOGY CLINIC | Age: 22
End: 2022-05-02

## 2022-05-02 NOTE — TELEPHONE ENCOUNTER
Pt called stating her meds are doing great and she feels wonderful. Everything is doing fine. She just wanted to update you on her condition after one month like you wanted.

## 2022-10-03 ENCOUNTER — OFFICE VISIT (OUTPATIENT)
Dept: CARDIOLOGY CLINIC | Age: 22
End: 2022-10-03
Payer: MEDICAID

## 2022-10-03 VITALS
BODY MASS INDEX: 34.73 KG/M2 | OXYGEN SATURATION: 100 % | DIASTOLIC BLOOD PRESSURE: 76 MMHG | HEIGHT: 63 IN | WEIGHT: 196 LBS | HEART RATE: 88 BPM | SYSTOLIC BLOOD PRESSURE: 124 MMHG

## 2022-10-03 DIAGNOSIS — I10 PRIMARY HYPERTENSION: Primary | ICD-10-CM

## 2022-10-03 PROCEDURE — G8484 FLU IMMUNIZE NO ADMIN: HCPCS | Performed by: INTERNAL MEDICINE

## 2022-10-03 PROCEDURE — G8427 DOCREV CUR MEDS BY ELIG CLIN: HCPCS | Performed by: INTERNAL MEDICINE

## 2022-10-03 PROCEDURE — 1036F TOBACCO NON-USER: CPT | Performed by: INTERNAL MEDICINE

## 2022-10-03 PROCEDURE — G8417 CALC BMI ABV UP PARAM F/U: HCPCS | Performed by: INTERNAL MEDICINE

## 2022-10-03 PROCEDURE — 99214 OFFICE O/P EST MOD 30 MIN: CPT | Performed by: INTERNAL MEDICINE

## 2022-10-03 RX ORDER — AMLODIPINE BESYLATE 2.5 MG/1
2.5 TABLET ORAL DAILY
Qty: 90 TABLET | Refills: 3 | Status: SHIPPED | OUTPATIENT
Start: 2022-10-03 | End: 2023-01-01

## 2022-10-03 ASSESSMENT — ENCOUNTER SYMPTOMS
ABDOMINAL DISTENTION: 0
CONSTIPATION: 0
CHEST TIGHTNESS: 0
SHORTNESS OF BREATH: 0
EYE PAIN: 0
SORE THROAT: 0
ABDOMINAL PAIN: 0
VOMITING: 0
APNEA: 0
WHEEZING: 0
COUGH: 0
EYE DISCHARGE: 0
FACIAL SWELLING: 0
EYE REDNESS: 0
DIARRHEA: 0
BLOOD IN STOOL: 0
NAUSEA: 0

## 2022-10-03 NOTE — PROGRESS NOTES
Cardiology Office Visit Note  Thomas CarlislemoJovita earl  [id number]  Phone: [phone]  Fax: [phone]                            Date:  10/3/2022  Patient: Aliza Rizzo  Age:  20 y. o., 2000    Referral: No ref. provider found    REASON FOR VISIT:  6 Month Follow-Up (Did have a couple high BP's but it has gotten better)         PROBLEM LIST:    Patient Active Problem List    Diagnosis Date Noted    PTSD (post-traumatic stress disorder) 09/10/2021     Priority: Low    Anxiety and depression 07/30/2021     Priority: Low         PRESENTATION: Aliza Rizzo is a 20y.o. year old female seen today for routine cardiology follow-up appointment. She is known to have hypertension and has been on low-dose amlodipine. She reports that over the last few weeks her blood pressure did spike to 150s/80s and was probably related to increased caffeine intake as well as stress. She has since decreased her caffeine consumption. She has been exercising more frequently and her stress level has improved. Her salt consumption has been fair. Her home blood pressure monitoring has been more favorable as was her blood pressure today recorded at 124/76. She also advised that she has quit smoking. No new or worsening symptoms. REVIEW OF SYSTEMS:  Review of Systems   Constitutional:  Negative for chills, fatigue and fever. HENT:  Negative for congestion, facial swelling, hearing loss and sore throat. Eyes:  Negative for pain, discharge, redness and visual disturbance. Respiratory:  Negative for apnea, cough, chest tightness, shortness of breath and wheezing. Cardiovascular:  Negative for chest pain, palpitations and leg swelling. Gastrointestinal:  Negative for abdominal distention, abdominal pain, blood in stool, constipation, diarrhea, nausea and vomiting. Endocrine: Negative for polydipsia, polyphagia and polyuria.    Genitourinary:  Negative for dysuria, flank pain, frequency and hematuria. Musculoskeletal:  Negative for joint swelling, myalgias and neck pain. Skin:  Negative for pallor and rash. Neurological:  Negative for dizziness, syncope, speech difficulty, light-headedness, numbness and headaches. Psychiatric/Behavioral:  Negative for confusion, hallucinations and sleep disturbance. Past Medical History:      Diagnosis Date    Anxiety     Depression     PTSD (post-traumatic stress disorder)        Past Surgical History:  No past surgical history on file. Medications:  Current Outpatient Medications   Medication Sig Dispense Refill    amLODIPine (NORVASC) 2.5 MG tablet Take 1 tablet by mouth daily 90 tablet 3    cariprazine hcl (VRAYLAR) 3 MG CAPS capsule Take 2 capsules by mouth daily 60 capsule 0    ZAFEMY 150-35 MCG/24HR APPLY 1 PATCH TOPICALLY ONCE A WEEK AS DIRECTED       No current facility-administered medications for this visit. Allergies:  Penicillins    Social History:  Social History     Occupational History    Not on file   Tobacco Use    Smoking status: Former     Packs/day: 0.50     Years: 3.00     Pack years: 1.50     Types: Cigarettes     Start date:      Quit date: 2022     Years since quittin.4    Smokeless tobacco: Never   Vaping Use    Vaping Use: Never used   Substance and Sexual Activity    Alcohol use: Yes     Comment: occ    Drug use: Never    Sexual activity: Yes     Partners: Male     Birth control/protection: Patch         Family History:   No family history on file. Physical Examination:  /76   Pulse 88   Ht 5' 3\" (1.6 m)   Wt 196 lb (88.9 kg)   SpO2 100%   BMI 34.72 kg/m²   Physical Exam  Vitals reviewed. Constitutional:       General: She is not in acute distress. Appearance: Normal appearance. She is not ill-appearing, toxic-appearing or diaphoretic. HENT:      Head: Normocephalic and atraumatic. Eyes:      General: No scleral icterus. Right eye: No discharge.          Left eye: No discharge. Conjunctiva/sclera: Conjunctivae normal.   Neck:      Vascular: No carotid bruit. Cardiovascular:      Rate and Rhythm: Normal rate and regular rhythm. No extrasystoles are present. Chest Wall: PMI is not displaced. No thrill. Heart sounds: S1 normal and S2 normal. No murmur heard. No friction rub. No gallop. Pulmonary:      Effort: Pulmonary effort is normal. No tachypnea or respiratory distress. Breath sounds: Normal breath sounds. No stridor. No wheezing, rhonchi or rales. Chest:      Chest wall: No tenderness. Abdominal:      General: Bowel sounds are normal. There is no distension. Palpations: Abdomen is soft. There is no mass. Tenderness: There is no abdominal tenderness. There is no guarding. Musculoskeletal:         General: No swelling. Cervical back: Normal range of motion and neck supple. No rigidity. Right lower leg: No edema. Left lower leg: No edema. Skin:     General: Skin is warm and dry. Coloration: Skin is not jaundiced. Findings: No erythema or rash. Neurological:      General: No focal deficit present. Mental Status: She is alert and oriented to person, place, and time. Mental status is at baseline. Psychiatric:         Mood and Affect: Mood normal.         Behavior: Behavior normal.         Thought Content: Thought content normal.         Labs:   CBC: No results found for: CBC   BMP: No results found for: BMP    BNP: No results found for: BNPINT   PT/INR: No results found for: PROTIME, INR, INR    APTT:  No results found for: APTT   CARDIAC ENZYMES:   Troponin   Date Value Ref Range Status   02/08/2022 <0.01 0.00 - 0.03 ng/mL Final     Comment:     <0.030 ng/ml       No measurable cardiac damage. 0.030-0.099 ng/ml  Values of troponin in this range suggest  possible myocardial damage. Repeat assay  4 to 6 hours after the current specimen.    >= 0.100 ng/ml     Indicative of myocardial damage.   Recommend continued monitoring of  patient status and cardiac markers. LIPID PANEL: No results found for: Yu Calloway  LIVER PROFILE:   AST   Date Value Ref Range Status   02/08/2022 19 5 - 32 U/L Final     ALT   Date Value Ref Range Status   02/08/2022 15 5 - 33 U/L Final     Albumin   Date Value Ref Range Status   02/08/2022 4.2 3.5 - 5.2 g/dL Final              ASSESSMENT and PLAN:    Chronic essential hypertension, goal blood pressure less than 130/80  History of preeclampsia  Obesity, BMI 34.7  No evidence for structural heart disease on echocardiogram  Stress test without evidence for ischemia. Excellent exercise capacity  Labs including renal function and TSH within normal limits    Continue amlodipine at current dose. Prescription refilled. Maintain low-sodium diet, less than 2 g per 24 hours. Stress modification. Limit caffeine consumption. Increase hydration with water. Discussed weight loss by way of heart healthy dieting and increasing exercise activities as tolerated. Return in about 1 year (around 10/3/2023). Electronically signed by Anjelica Crawford MD on 10/3/2022 at 10:41 AM    Anjelica Crawford MD, PIPER, [facility] [place]  Noninvasive Cardiology Consultant    This dictation was generated by voice recognition computer software. Although all attempts are made to edit the dictation for accuracy, there may be errors in the transcription that are not intended.

## 2022-12-16 ENCOUNTER — OFFICE VISIT (OUTPATIENT)
Dept: PRIMARY CARE CLINIC | Age: 22
End: 2022-12-16
Payer: MEDICAID

## 2022-12-16 VITALS
OXYGEN SATURATION: 98 % | HEART RATE: 107 BPM | SYSTOLIC BLOOD PRESSURE: 122 MMHG | RESPIRATION RATE: 18 BRPM | WEIGHT: 197.2 LBS | BODY MASS INDEX: 34.94 KG/M2 | HEIGHT: 63 IN | DIASTOLIC BLOOD PRESSURE: 74 MMHG | TEMPERATURE: 97.2 F

## 2022-12-16 DIAGNOSIS — R19.7 DIARRHEA, UNSPECIFIED TYPE: Primary | ICD-10-CM

## 2022-12-16 LAB
ALBUMIN SERPL-MCNC: 4.3 G/DL (ref 3.5–5.2)
ALP BLD-CCNC: 73 U/L (ref 35–104)
ALT SERPL-CCNC: 25 U/L (ref 5–33)
ANION GAP SERPL CALCULATED.3IONS-SCNC: 12 MMOL/L (ref 7–19)
AST SERPL-CCNC: 20 U/L (ref 5–32)
BILIRUB SERPL-MCNC: 0.3 MG/DL (ref 0.2–1.2)
BUN BLDV-MCNC: 8 MG/DL (ref 6–20)
CALCIUM SERPL-MCNC: 9.4 MG/DL (ref 8.6–10)
CHLORIDE BLD-SCNC: 103 MMOL/L (ref 98–111)
CO2: 22 MMOL/L (ref 22–29)
CREAT SERPL-MCNC: 0.7 MG/DL (ref 0.5–0.9)
GFR SERPL CREATININE-BSD FRML MDRD: >60 ML/MIN/{1.73_M2}
GLUCOSE BLD-MCNC: 191 MG/DL (ref 74–109)
HCT VFR BLD CALC: 40.7 % (ref 37–47)
HEMOGLOBIN: 13.6 G/DL (ref 12–16)
MCH RBC QN AUTO: 27.9 PG (ref 27–31)
MCHC RBC AUTO-ENTMCNC: 33.4 G/DL (ref 33–37)
MCV RBC AUTO: 83.6 FL (ref 81–99)
PDW BLD-RTO: 12.8 % (ref 11.5–14.5)
PLATELET # BLD: 317 K/UL (ref 130–400)
PMV BLD AUTO: 10 FL (ref 9.4–12.3)
POTASSIUM SERPL-SCNC: 3.7 MMOL/L (ref 3.5–5)
RBC # BLD: 4.87 M/UL (ref 4.2–5.4)
SODIUM BLD-SCNC: 137 MMOL/L (ref 136–145)
TOTAL PROTEIN: 7.4 G/DL (ref 6.6–8.7)
WBC # BLD: 9.9 K/UL (ref 4.8–10.8)

## 2022-12-16 PROCEDURE — G8417 CALC BMI ABV UP PARAM F/U: HCPCS | Performed by: NURSE PRACTITIONER

## 2022-12-16 PROCEDURE — 1036F TOBACCO NON-USER: CPT | Performed by: NURSE PRACTITIONER

## 2022-12-16 PROCEDURE — 99213 OFFICE O/P EST LOW 20 MIN: CPT | Performed by: NURSE PRACTITIONER

## 2022-12-16 PROCEDURE — G8484 FLU IMMUNIZE NO ADMIN: HCPCS | Performed by: NURSE PRACTITIONER

## 2022-12-16 PROCEDURE — G8427 DOCREV CUR MEDS BY ELIG CLIN: HCPCS | Performed by: NURSE PRACTITIONER

## 2022-12-16 RX ORDER — GREEN TEA/HOODIA GORDONII 315-12.5MG
1 CAPSULE ORAL DAILY
Qty: 30 TABLET | Refills: 0 | Status: SHIPPED | OUTPATIENT
Start: 2022-12-16 | End: 2023-01-15

## 2022-12-16 RX ORDER — DULOXETIN HYDROCHLORIDE 60 MG/1
CAPSULE, DELAYED RELEASE ORAL
COMMUNITY
Start: 2022-10-14

## 2022-12-16 SDOH — ECONOMIC STABILITY: FOOD INSECURITY: WITHIN THE PAST 12 MONTHS, YOU WORRIED THAT YOUR FOOD WOULD RUN OUT BEFORE YOU GOT MONEY TO BUY MORE.: NEVER TRUE

## 2022-12-16 SDOH — ECONOMIC STABILITY: FOOD INSECURITY: WITHIN THE PAST 12 MONTHS, THE FOOD YOU BOUGHT JUST DIDN'T LAST AND YOU DIDN'T HAVE MONEY TO GET MORE.: NEVER TRUE

## 2022-12-16 ASSESSMENT — ENCOUNTER SYMPTOMS
ALLERGIC/IMMUNOLOGIC NEGATIVE: 1
VOMITING: 0
ABDOMINAL DISTENTION: 0
NAUSEA: 0
COUGH: 0
EYES NEGATIVE: 1
RESPIRATORY NEGATIVE: 1
WHEEZING: 0
SHORTNESS OF BREATH: 0
ABDOMINAL PAIN: 0
CONSTIPATION: 0
DIARRHEA: 1

## 2022-12-16 ASSESSMENT — SOCIAL DETERMINANTS OF HEALTH (SDOH): HOW HARD IS IT FOR YOU TO PAY FOR THE VERY BASICS LIKE FOOD, HOUSING, MEDICAL CARE, AND HEATING?: NOT VERY HARD

## 2022-12-16 NOTE — PROGRESS NOTES
[facility] PHYSICIAN SERVICES  [facility]  [address] [place] [address]  [address] [place] [address]  Dept: [phone]  Dept Fax: [phone]  Loc: [phone]    Leland Leroy is a 20 y.o. female who presents today for her medical conditions/complaints as noted below. Leland Leroy is c/o of Diarrhea (Pt is here for diarrhea that she has had for 2 weeks. Pt states on 11/17 she got her cymbalta increased, but doesn't think it is related. She has not had any other symptoms. )        HPI:     HPI this 65-year-old female presents today for diarrhea. She states that she has had it for about 2 weeks. She states that she had an increase in her Cymbalta on 11/17/2022. She states she is [place] C[place] does not think that that is related but is the only change that she can think of. She denies any other symptoms such as fever, cramping, abdominal pain or nausea or vomiting. She also denies any upper respiratory symptoms. Chief Complaint   Patient presents with    Diarrhea     Pt is here for diarrhea that she has had for 2 weeks. Pt states on 11/17 she got her cymbalta increased, but doesn't think it is related. She has not had any other symptoms. Past Medical History:   Diagnosis Date    ADHD (attention deficit hyperactivity disorder) 12/02/2022    Anxiety     Depression     PTSD (post-traumatic stress disorder)       No past surgical history on file.     Vitals 12/16/2022 10/3/2022 4/1/2022 3/8/2022 2/28/2022 2/5/2436   SYSTOLIC 368 778 694 153 195 233   DIASTOLIC 74 76 84 86 88 65   Site Left Upper Arm - - Left Upper Arm Left Upper Arm -   Position Sitting - - Sitting Sitting -   Cuff Size Large Adult - - Medium Adult - -   Pulse 107 88 50 74 85 117   Temp 97.2 - - 96.6 - 97.8   Resp 18 - - 16 - -   SpO2 98 100 99 99 - 98   Weight 197 lb 3.2 oz 196 lb 182 lb 179 lb 178 lb 180 lb 3.2 oz   Height 5' 3\" 5' 3\" 5' 3\" 5' 3\" 5' 3\" 5' 2.5\"   Body mass index 34.93 kg/m2 34.72 kg/m2 32.24 kg/m2 31.7 kg/m2 31.53 kg/m2 32.43 kg/m2   Some recent data might be hidden       No family history on file. Social History     Tobacco Use    Smoking status: Former     Packs/day: 0.50     Years: 3.00     Pack years: 1.50     Types: Cigarettes     Start date:      Quit date: 2022     Years since quittin.6    Smokeless tobacco: Never   Substance Use Topics    Alcohol use: Yes     Comment: occ      Current Outpatient Medications on File Prior to Visit   Medication Sig Dispense Refill    DULoxetine (CYMBALTA) 60 MG extended release capsule       amLODIPine (NORVASC) 2.5 MG tablet Take 1 tablet by mouth daily 90 tablet 3    ZAFEMY 150-35 MCG/24HR APPLY 1 PATCH TOPICALLY ONCE A WEEK AS DIRECTED       No current facility-administered medications on file prior to visit. Allergies   Allergen Reactions    Penicillins Hives       Health Maintenance   Topic Date Due    HIV screen  Never done    [address] screen  Never done    Hepatitis C screen  Never done    Pap smear  Never done    HPV vaccine (1 - 2-dose series) 2023 (Originally 2010)    DTaP/Tdap/Td vaccine (1 - Tdap) 2023 (Originally 2018)    Flu vaccine (1) 2023 (Originally 2022)    Varicella vaccine (1 of 2 - 2-dose childhood series) 2025 (Originally 2000)    COVID-19 Vaccine (1) 2030 (Originally 1999)    Depression Monitoring  2023    Hepatitis A vaccine  Aged Out    Hib vaccine  Aged Out    Meningococcal (ACWY) vaccine  Aged Out    Pneumococcal 0-64 years Vaccine  Aged Out       Subjective:      Review of Systems   Constitutional: Negative. Negative for chills, fatigue and fever. HENT: Negative. Eyes: Negative. Respiratory: Negative. Negative for cough, shortness of breath and wheezing. Cardiovascular: Negative. Gastrointestinal:  Positive for diarrhea. Negative for abdominal distention, abdominal pain, constipation, nausea and vomiting. 3 times daily very loose large quantities .  No watery stool . No pain. Increased gas that is wretched    Endocrine: Negative. Genitourinary: Negative. Negative for difficulty urinating, dysuria and menstrual problem. Musculoskeletal: Negative. Negative for myalgias. Skin: Negative. Negative for rash. Allergic/Immunologic: Negative. Neurological: Negative. Negative for headaches. Hematological: Negative. Psychiatric/Behavioral: Negative. Objective:     Physical Exam  Vitals and nursing note reviewed. Constitutional:       General: She is not in acute distress. Appearance: Normal appearance. She is not ill-appearing or toxic-appearing. HENT:      Head: Normocephalic and atraumatic. Nose: Nose normal.      Mouth/Throat:      Mouth: Mucous membranes are moist.   Eyes:      Pupils: Pupils are equal, round, and reactive to light. Cardiovascular:      Rate and Rhythm: Normal rate and regular rhythm. Pulses: Normal pulses. Heart sounds: Normal heart sounds. No murmur heard. Pulmonary:      Effort: Pulmonary effort is normal. No respiratory distress. Breath sounds: Normal breath sounds. No wheezing, rhonchi or rales. Abdominal:      General: Bowel sounds are normal. There is no distension. Palpations: Abdomen is soft. There is no mass. Tenderness: There is no abdominal tenderness. There is no guarding or rebound. Hernia: No hernia is present. Musculoskeletal:         General: Normal range of motion. Cervical back: Normal range of motion and neck supple. No rigidity. Lymphadenopathy:      Cervical: No cervical adenopathy. Skin:     General: Skin is warm and dry. Coloration: Skin is not jaundiced or pale. Findings: No erythema or rash. Neurological:      Mental Status: She is alert and oriented to person, place, and time. Mental status is at baseline. Psychiatric:         Mood and Affect: Mood normal.         Behavior: Behavior normal.         Thought Content:  Thought content normal. Judgment: Judgment normal.     /74 (Site: Left Upper Arm, Position: Sitting, Cuff Size: Large Adult)   Pulse (!) 107   Temp 97.2 °F (36.2 °C) (Temporal)   Resp 18   Ht 5' 3\" (1.6 m)   Wt 197 lb 3.2 oz (89.4 kg)   SpO2 98%   BMI 34.93 kg/m²     Assessment:       Diagnosis Orders   1. Diarrhea, unspecified type  CBC    Comprehensive Metabolic Panel            Plan:   CBC and CMP in office today. Lab Review   Lab Results   Component Value Date/Time     12/16/2022 04:00 PM    K 3.7 12/16/2022 04:00 PM     12/16/2022 04:00 PM    CO2 22 12/16/2022 04:00 PM    BUN 8 12/16/2022 04:00 PM    CREATININE 0.7 12/16/2022 04:00 PM    GLUCOSE 191 12/16/2022 04:00 PM    CALCIUM 9.4 12/16/2022 04:00 PM     Lab Results   Component Value Date/Time    WBC 9.9 12/16/2022 04:00 PM    HGB 13.6 12/16/2022 04:00 PM    HCT 40.7 12/16/2022 04:00 PM    MCV 83.6 12/16/2022 04:00 PM     12/16/2022 04:00 PM       Start probiotic daily  Limit sugary drinks such as sodas or fruit juice  Limit dairy products  Keep a food journal and see if there is a correlation between diarrhea and food intake. Consider further gallbladder work-up if symptoms persist.     Patient given educational materials -see patient instructions. Discussed use, benefit, and side effects of prescribed medications. All patient questions answered. Pt voiced understanding. Reviewed health maintenance. Instructed to continue currentmedications, diet and exercise. Patient agreed with treatment plan. Follow up as directed. MEDICATIONS:  Orders Placed This Encounter   Medications    Probiotic Acidophilus (FLORANEX) TABS     Sig: Take 1 tablet by mouth daily     Dispense:  30 tablet     Refill:  0         ORDERS:  Orders Placed This Encounter   Procedures    CBC    Comprehensive Metabolic Panel       Follow-up:  Return in about 4 weeks (around 1/13/2023).     PATIENT INSTRUCTIONS:  There are no Patient Instructions on file for this visit. Electronically signed by CASSI Mills NP on 12/19/2022 at 8:08 AM    EMR Dragon/transcription disclaimer:  Much of thisencounter note is electronic transcription/translation of spoken language to printed texts. The electronic translation of spoken language may be erroneous, or at times, nonsensical words or phrases may be inadvertentlytranscribed.   Although I have reviewed the note for such errors, some may still exist.

## 2022-12-20 ENCOUNTER — TELEPHONE (OUTPATIENT)
Dept: PRIMARY CARE CLINIC | Age: 22
End: 2022-12-20

## 2022-12-20 DIAGNOSIS — R19.7 DIARRHEA, UNSPECIFIED TYPE: Primary | ICD-10-CM

## 2022-12-20 NOTE — TELEPHONE ENCOUNTER
Pt states she was in Friday and saw [facility] for Diarrhea. Had labs. Which were normal. Pt states she still having Diarrhea and asking for advise.

## 2022-12-20 NOTE — TELEPHONE ENCOUNTER
How long has the diarrhea been going on? It does not look like they did a stool culture C. difficile. If this is something new then she may need to pursue that. You might want to send this to [facility] since she saw her for this issue as well.

## 2022-12-21 DIAGNOSIS — R19.7 DIARRHEA, UNSPECIFIED TYPE: ICD-10-CM

## 2022-12-21 LAB
C DIFF TOXIN/ANTIGEN: NORMAL
LACTOFERRIN, FECAL: NEGATIVE

## 2022-12-22 DIAGNOSIS — R73.09 ELEVATED GLUCOSE: Primary | ICD-10-CM

## 2022-12-22 DIAGNOSIS — Z13.1 SCREENING FOR DIABETES MELLITUS (DM): ICD-10-CM

## 2022-12-22 DIAGNOSIS — R73.09 ELEVATED GLUCOSE: ICD-10-CM

## 2022-12-22 LAB — HBA1C MFR BLD: 5.6 % (ref 4–6)

## 2022-12-23 LAB
CAMPYLOBACTER ANTIGEN: NORMAL
CULTURE, STOOL: NORMAL
E COLI SHIGA TOXIN ASSAY: NORMAL

## 2023-02-06 RX ORDER — L. ACIDOPHILUS/PECTIN, CITRUS 25MM-100MG
TABLET ORAL
Qty: 30 TABLET | Refills: 0 | Status: SHIPPED | OUTPATIENT
Start: 2023-02-06

## 2023-03-14 ENCOUNTER — OFFICE VISIT (OUTPATIENT)
Dept: PRIMARY CARE CLINIC | Age: 23
End: 2023-03-14
Payer: MEDICAID

## 2023-03-14 VITALS
WEIGHT: 202.2 LBS | HEIGHT: 62 IN | OXYGEN SATURATION: 97 % | DIASTOLIC BLOOD PRESSURE: 82 MMHG | SYSTOLIC BLOOD PRESSURE: 130 MMHG | TEMPERATURE: 97.3 F | RESPIRATION RATE: 18 BRPM | HEART RATE: 99 BPM | BODY MASS INDEX: 37.21 KG/M2

## 2023-03-14 DIAGNOSIS — I10 ESSENTIAL HYPERTENSION: ICD-10-CM

## 2023-03-14 DIAGNOSIS — L50.9 HIVES: Primary | ICD-10-CM

## 2023-03-14 LAB
ALBUMIN SERPL-MCNC: 4 G/DL (ref 3.5–5.2)
ALP SERPL-CCNC: 82 U/L (ref 35–104)
ALT SERPL-CCNC: 20 U/L (ref 5–33)
ANION GAP SERPL CALCULATED.3IONS-SCNC: 14 MMOL/L (ref 7–19)
AST SERPL-CCNC: 12 U/L (ref 5–32)
BASOPHILS # BLD: 0.1 K/UL (ref 0–0.2)
BASOPHILS NFR BLD: 0.5 % (ref 0–1)
BILIRUB SERPL-MCNC: <0.2 MG/DL (ref 0.2–1.2)
BUN SERPL-MCNC: 14 MG/DL (ref 6–20)
CALCIUM SERPL-MCNC: 9 MG/DL (ref 8.6–10)
CHLORIDE SERPL-SCNC: 103 MMOL/L (ref 98–111)
CO2 SERPL-SCNC: 24 MMOL/L (ref 22–29)
CREAT SERPL-MCNC: 0.9 MG/DL (ref 0.5–0.9)
EOSINOPHIL # BLD: 0.1 K/UL (ref 0–0.6)
EOSINOPHIL NFR BLD: 0.5 % (ref 0–5)
ERYTHROCYTE [DISTWIDTH] IN BLOOD BY AUTOMATED COUNT: 12.6 % (ref 11.5–14.5)
GLUCOSE SERPL-MCNC: 113 MG/DL (ref 74–109)
HCT VFR BLD AUTO: 38.8 % (ref 37–47)
HGB BLD-MCNC: 12.7 G/DL (ref 12–16)
IMM GRANULOCYTES # BLD: 0.1 K/UL
LYMPHOCYTES # BLD: 2.3 K/UL (ref 1.1–4.5)
LYMPHOCYTES NFR BLD: 18.5 % (ref 20–40)
MCH RBC QN AUTO: 27.5 PG (ref 27–31)
MCHC RBC AUTO-ENTMCNC: 32.7 G/DL (ref 33–37)
MCV RBC AUTO: 84 FL (ref 81–99)
MONOCYTES # BLD: 0.9 K/UL (ref 0–0.9)
MONOCYTES NFR BLD: 7.2 % (ref 0–10)
NEUTROPHILS # BLD: 8.9 K/UL (ref 1.5–7.5)
NEUTS SEG NFR BLD: 72.9 % (ref 50–65)
PLATELET # BLD AUTO: 355 K/UL (ref 130–400)
PMV BLD AUTO: 9.8 FL (ref 9.4–12.3)
POTASSIUM SERPL-SCNC: 4 MMOL/L (ref 3.5–5)
PROT SERPL-MCNC: 7.2 G/DL (ref 6.6–8.7)
RBC # BLD AUTO: 4.62 M/UL (ref 4.2–5.4)
SODIUM SERPL-SCNC: 141 MMOL/L (ref 136–145)
TSH SERPL DL<=0.005 MIU/L-ACNC: 1.66 UIU/ML (ref 0.35–5.5)
WBC # BLD AUTO: 12.3 K/UL (ref 4.8–10.8)

## 2023-03-14 PROCEDURE — 3075F SYST BP GE 130 - 139MM HG: CPT | Performed by: FAMILY MEDICINE

## 2023-03-14 PROCEDURE — G8417 CALC BMI ABV UP PARAM F/U: HCPCS | Performed by: FAMILY MEDICINE

## 2023-03-14 PROCEDURE — 3079F DIAST BP 80-89 MM HG: CPT | Performed by: FAMILY MEDICINE

## 2023-03-14 PROCEDURE — 99214 OFFICE O/P EST MOD 30 MIN: CPT | Performed by: FAMILY MEDICINE

## 2023-03-14 PROCEDURE — 1036F TOBACCO NON-USER: CPT | Performed by: FAMILY MEDICINE

## 2023-03-14 PROCEDURE — G8427 DOCREV CUR MEDS BY ELIG CLIN: HCPCS | Performed by: FAMILY MEDICINE

## 2023-03-14 PROCEDURE — G8484 FLU IMMUNIZE NO ADMIN: HCPCS | Performed by: FAMILY MEDICINE

## 2023-03-14 RX ORDER — EPINEPHRINE 0.3 MG/.3ML
0.3 INJECTION SUBCUTANEOUS ONCE
Qty: 0.3 ML | Refills: 0 | Status: SHIPPED | OUTPATIENT
Start: 2023-03-14 | End: 2023-03-14

## 2023-03-14 RX ORDER — METHYLPREDNISOLONE 4 MG/1
TABLET ORAL
COMMUNITY
Start: 2023-03-11

## 2023-03-14 RX ORDER — HYDROXYZINE HYDROCHLORIDE 25 MG/1
1 TABLET, FILM COATED ORAL 3 TIMES DAILY
COMMUNITY
Start: 2023-03-09

## 2023-03-14 SDOH — ECONOMIC STABILITY: HOUSING INSECURITY
IN THE LAST 12 MONTHS, WAS THERE A TIME WHEN YOU DID NOT HAVE A STEADY PLACE TO SLEEP OR SLEPT IN A SHELTER (INCLUDING NOW)?: NO

## 2023-03-14 SDOH — ECONOMIC STABILITY: FOOD INSECURITY: WITHIN THE PAST 12 MONTHS, YOU WORRIED THAT YOUR FOOD WOULD RUN OUT BEFORE YOU GOT MONEY TO BUY MORE.: NEVER TRUE

## 2023-03-14 SDOH — ECONOMIC STABILITY: INCOME INSECURITY: HOW HARD IS IT FOR YOU TO PAY FOR THE VERY BASICS LIKE FOOD, HOUSING, MEDICAL CARE, AND HEATING?: NOT HARD AT ALL

## 2023-03-14 SDOH — ECONOMIC STABILITY: FOOD INSECURITY: WITHIN THE PAST 12 MONTHS, THE FOOD YOU BOUGHT JUST DIDN'T LAST AND YOU DIDN'T HAVE MONEY TO GET MORE.: NEVER TRUE

## 2023-03-14 ASSESSMENT — ENCOUNTER SYMPTOMS
BACK PAIN: 0
COUGH: 0
ABDOMINAL PAIN: 0
DIARRHEA: 0
VOMITING: 0
COLOR CHANGE: 0
EYE DISCHARGE: 0
ROS SKIN COMMENTS: +HIVES
WHEEZING: 0
NAUSEA: 0

## 2023-03-14 ASSESSMENT — PATIENT HEALTH QUESTIONNAIRE - PHQ9
10. IF YOU CHECKED OFF ANY PROBLEMS, HOW DIFFICULT HAVE THESE PROBLEMS MADE IT FOR YOU TO DO YOUR WORK, TAKE CARE OF THINGS AT HOME, OR GET ALONG WITH OTHER PEOPLE: 0
8. MOVING OR SPEAKING SO SLOWLY THAT OTHER PEOPLE COULD HAVE NOTICED. OR THE OPPOSITE, BEING SO FIGETY OR RESTLESS THAT YOU HAVE BEEN MOVING AROUND A LOT MORE THAN USUAL: 0
SUM OF ALL RESPONSES TO PHQ QUESTIONS 1-9: 0
3. TROUBLE FALLING OR STAYING ASLEEP: 0
6. FEELING BAD ABOUT YOURSELF - OR THAT YOU ARE A FAILURE OR HAVE LET YOURSELF OR YOUR FAMILY DOWN: 0
SUM OF ALL RESPONSES TO PHQ QUESTIONS 1-9: 0
1. LITTLE INTEREST OR PLEASURE IN DOING THINGS: 0
SUM OF ALL RESPONSES TO PHQ QUESTIONS 1-9: 0
SUM OF ALL RESPONSES TO PHQ QUESTIONS 1-9: 0
2. FEELING DOWN, DEPRESSED OR HOPELESS: 0
SUM OF ALL RESPONSES TO PHQ9 QUESTIONS 1 & 2: 0
9. THOUGHTS THAT YOU WOULD BE BETTER OFF DEAD, OR OF HURTING YOURSELF: 0
4. FEELING TIRED OR HAVING LITTLE ENERGY: 0
7. TROUBLE CONCENTRATING ON THINGS, SUCH AS READING THE NEWSPAPER OR WATCHING TELEVISION: 0
5. POOR APPETITE OR OVEREATING: 0

## 2023-03-14 NOTE — PROGRESS NOTES
SUBJECTIVE:    Patient ID: Aliza Rizzo is a 24 y.o. female. HPI:   Patient is seen today for problems with hives and allergic reaction. She states that she woke up on Friday with hives. She states that she has not had any change in medications. She states that she has not started anything new and has not had any new exposures to any chemicals or detergents. She has not had any recent tick bites. She denies any recent illness. She states that she did have a shoulder that was bothering her the day or 2 before but she states that resolved. She states that she had taken ibuprofen but had taken ibuprofen previously and never had any issues. She states that she has also had some whiteness of her fingers but she states that she did not think it was an issue she thought it was from the swelling. She states that she is on amlodipine for blood pressure and states that she tolerates this well. This is not anything new for her either. She has never had any issues with her fingers or discoloration before either. Past Medical History:   Diagnosis Date    ADHD (attention deficit hyperactivity disorder) 12/02/2022    Anxiety     Depression     PTSD (post-traumatic stress disorder)       Current Outpatient Medications on File Prior to Visit   Medication Sig Dispense Refill    hydrOXYzine HCl (ATARAX) 25 MG tablet Take 1 tablet by mouth 3 times daily      methylPREDNISolone (MEDROL DOSEPACK) 4 MG tablet TAKE BY MOUTH AS DIRECTED ON INSIDE OF PACKAGE      DULoxetine (CYMBALTA) 60 MG extended release capsule       amLODIPine (NORVASC) 2.5 MG tablet Take 1 tablet by mouth daily 90 tablet 3    ZAFEMY 150-35 MCG/24HR APPLY 1 PATCH TOPICALLY ONCE A WEEK AS DIRECTED       No current facility-administered medications on file prior to visit. Allergies   Allergen Reactions    Penicillins Hives       Review of Systems   Constitutional:  Negative for activity change, appetite change and fever.    HENT:  Negative for congestion and nosebleeds. Eyes:  Negative for discharge. Respiratory:  Negative for cough and wheezing. Cardiovascular:  Negative for chest pain and leg swelling. Gastrointestinal:  Negative for abdominal pain, diarrhea, nausea and vomiting. Genitourinary:  Negative for difficulty urinating, frequency and urgency. Musculoskeletal:  Negative for back pain and gait problem. Skin:  Negative for color change and rash. +hives   Neurological:  Negative for dizziness and headaches. Hematological:  Does not bruise/bleed easily. Psychiatric/Behavioral:  Negative for sleep disturbance and suicidal ideas. OBJECTIVE:    Physical Exam  Vitals reviewed. Constitutional:       General: She is not in acute distress. Appearance: Normal appearance. She is well-developed. She is not diaphoretic. HENT:      Head: Normocephalic and atraumatic. Right Ear: External ear normal.      Left Ear: External ear normal.   Cardiovascular:      Rate and Rhythm: Normal rate and regular rhythm. Pulses: Normal pulses. Heart sounds: Normal heart sounds. No murmur heard. Pulmonary:      Effort: Pulmonary effort is normal. No respiratory distress. Breath sounds: Normal breath sounds. Musculoskeletal:      Cervical back: Normal range of motion and neck supple. Skin:     General: Skin is warm and dry. Neurological:      General: No focal deficit present. Mental Status: She is alert and oriented to person, place, and time. Mental status is at baseline. Psychiatric:         Mood and Affect: Mood normal.         Behavior: Behavior normal.         Thought Content: Thought content normal.         Judgment: Judgment normal.      /82   Pulse 99   Temp 97.3 °F (36.3 °C)   Resp 18   Ht 5' 2\" (1.575 m)   Wt 202 lb 3.2 oz (91.7 kg)   SpO2 97%   BMI 36.98 kg/m²      ASSESSMENT/PLAN:    1.  Hives  -     CBC with Auto Differential  -     TSH with Reflex to FT4  -     Comprehensive Metabolic Panel  -     Allergen, Food, Alpha-Gal Panel, IgE  -     External Referral To Allergy  2. Essential hypertension       See lab orders. Will notify results. I am going to put a referral in for her to see allergy. I have sent an EpiPen to the pharmacy for her to have if needed. We will notify her of the results of the labs that we have drawn today and determine further work-up from there. PDMP Monitoring:    Last PDMP [place] as Reviewed [facility]):  Review User Review Instant Review Result            Urine Drug Screenings (1 yr)    No resulted procedures found. Medication Contract and Consent for Opioid Use Documents Filed        No documents found                     EMR Dragon/transcription disclaimer:  Much of this encounter note is electronic transcription/translation of spoken language toprinted texts. The electronic translation of spoken language may be erroneous, or at times, nonsensical words or phrases may be inadvertently transcribed.   Although I have reviewed the note for such errors, some may stillexist.

## 2023-03-16 LAB
ALLERGEN,FOOD, ALPHA-GAL IGE: <0.1 KU/L
BEEF IGE QN: <0.1 KU/L
DEPRECATED MISC ALLERGEN IGE RAST QL: NORMAL
LAMB IGE QN: <0.1 KU/L
PORK IGE QN: <0.1 KU/L

## 2023-05-08 ENCOUNTER — TELEPHONE (OUTPATIENT)
Dept: PRIMARY CARE CLINIC | Age: 23
End: 2023-05-08

## 2023-05-08 DIAGNOSIS — L50.9 HIVES: Primary | ICD-10-CM

## 2023-05-08 NOTE — TELEPHONE ENCOUNTER
If she wanting to get a second opinion? If so I can put a referral in for her to see an allergist somewhere else. It would probably have to be [place] or [place] if she is willing to see someone there.

## 2023-05-08 NOTE — TELEPHONE ENCOUNTER
Pt notified and would like to try to be referred to [facility] to get a second opinion.

## 2023-05-08 NOTE — TELEPHONE ENCOUNTER
Pt called voicing that she had gone to [facility] for her allergy testing. She is not happy how the appt went. She voices there were only a couple of things they tested for and told her she is allergic to dust mites and that would not cause her to break out in hives. They told her that when she has a flare that she can come to their office and get some lab drawn but he can not tell her why she has these episodes of hives. Pt voices that she has had these episodes for a while now and they just keep getting more intense with chest tightness and the episodes last longer. She would like to have a plan on what to do when it happens again.

## 2023-10-03 ENCOUNTER — OFFICE VISIT (OUTPATIENT)
Dept: CARDIOLOGY CLINIC | Age: 23
End: 2023-10-03
Payer: MEDICAID

## 2023-10-03 VITALS
DIASTOLIC BLOOD PRESSURE: 70 MMHG | BODY MASS INDEX: 36.5 KG/M2 | HEIGHT: 63 IN | SYSTOLIC BLOOD PRESSURE: 122 MMHG | HEART RATE: 65 BPM | WEIGHT: 206 LBS

## 2023-10-03 DIAGNOSIS — R94.31 ABNORMAL EKG: Primary | ICD-10-CM

## 2023-10-03 PROCEDURE — 93000 ELECTROCARDIOGRAM COMPLETE: CPT | Performed by: INTERNAL MEDICINE

## 2023-10-03 PROCEDURE — G8484 FLU IMMUNIZE NO ADMIN: HCPCS | Performed by: INTERNAL MEDICINE

## 2023-10-03 PROCEDURE — 1036F TOBACCO NON-USER: CPT | Performed by: INTERNAL MEDICINE

## 2023-10-03 PROCEDURE — 99214 OFFICE O/P EST MOD 30 MIN: CPT | Performed by: INTERNAL MEDICINE

## 2023-10-03 PROCEDURE — G8417 CALC BMI ABV UP PARAM F/U: HCPCS | Performed by: INTERNAL MEDICINE

## 2023-10-03 PROCEDURE — G8427 DOCREV CUR MEDS BY ELIG CLIN: HCPCS | Performed by: INTERNAL MEDICINE

## 2023-10-03 RX ORDER — PROPRANOLOL HYDROCHLORIDE 10 MG/1
10 TABLET ORAL PRN
COMMUNITY
Start: 2023-05-15

## 2023-10-03 ASSESSMENT — ENCOUNTER SYMPTOMS
DIARRHEA: 0
CONSTIPATION: 0
ABDOMINAL PAIN: 0
BLOOD IN STOOL: 0
FACIAL SWELLING: 0
APNEA: 0
EYE PAIN: 0
WHEEZING: 0
EYE REDNESS: 0
SORE THROAT: 0
VOMITING: 0
ABDOMINAL DISTENTION: 0
CHEST TIGHTNESS: 0
SHORTNESS OF BREATH: 0
COUGH: 0
EYE DISCHARGE: 0
NAUSEA: 0

## 2023-10-03 NOTE — PROGRESS NOTES
Appearance: Normal appearance. She is not ill-appearing, toxic-appearing or diaphoretic. HENT:      Head: Normocephalic and atraumatic. Eyes:      General: No scleral icterus. Right eye: No discharge. Left eye: No discharge. Conjunctiva/sclera: Conjunctivae normal.   Neck:      Vascular: No carotid bruit. Cardiovascular:      Rate and Rhythm: Normal rate and regular rhythm. No extrasystoles are present. Chest Wall: PMI is not displaced. No thrill. Heart sounds: S1 normal and S2 normal. No murmur heard. No friction rub. No gallop. Pulmonary:      Effort: Pulmonary effort is normal. No tachypnea or respiratory distress. Breath sounds: Normal breath sounds. No stridor. No wheezing, rhonchi or rales. Chest:      Chest wall: No tenderness. Abdominal:      General: Bowel sounds are normal. There is no distension. Palpations: Abdomen is soft. There is no mass. Tenderness: There is no abdominal tenderness. There is no guarding. Musculoskeletal:         General: No swelling. Cervical back: Normal range of motion and neck supple. No rigidity. Right lower leg: No edema. Left lower leg: No edema. Skin:     General: Skin is warm and dry. Coloration: Skin is not jaundiced. Findings: No erythema or rash. Neurological:      General: No focal deficit present. Mental Status: She is alert and oriented to person, place, and time. Mental status is at baseline. Psychiatric:         Mood and Affect: Mood normal.         Behavior: Behavior normal.         Thought Content: Thought content normal.           Labs:   CBC: No results found for: \"CBC\"   BMP: No results found for:  \"BMP\"    BNP: No results found for: \"BNPINT\"   PT/INR: No results found for: \"PROTIME\", \"INR\"    APTT:  No results found for: \"APTT\"   CARDIAC ENZYMES:   Troponin   Date Value Ref Range Status   02/08/2022 <0.01 0.00 - 0.03 ng/mL Final     Comment:     <0.030 ng/ml

## 2023-11-10 ENCOUNTER — NURSE ONLY (OUTPATIENT)
Dept: PRIMARY CARE CLINIC | Age: 23
End: 2023-11-10
Payer: MEDICAID

## 2023-11-10 DIAGNOSIS — R10.9 FLANK PAIN: Primary | ICD-10-CM

## 2023-11-10 LAB
APPEARANCE FLUID: CLEAR
BILIRUBIN, POC: NORMAL
BLOOD URINE, POC: NORMAL
CLARITY, POC: CLEAR
COLOR, POC: CLEAR
GLUCOSE URINE, POC: NORMAL
KETONES, POC: NORMAL
LEUKOCYTE EST, POC: NORMAL
NITRITE, POC: NORMAL
PH, POC: 5.5
PROTEIN, POC: NORMAL
SPECIFIC GRAVITY, POC: 1.02
UROBILINOGEN, POC: 0.2

## 2023-11-10 PROCEDURE — 81002 URINALYSIS NONAUTO W/O SCOPE: CPT | Performed by: NURSE PRACTITIONER

## 2023-11-13 ENCOUNTER — OFFICE VISIT (OUTPATIENT)
Dept: PRIMARY CARE CLINIC | Age: 23
End: 2023-11-13
Payer: MEDICAID

## 2023-11-13 VITALS
DIASTOLIC BLOOD PRESSURE: 64 MMHG | SYSTOLIC BLOOD PRESSURE: 110 MMHG | BODY MASS INDEX: 36.68 KG/M2 | RESPIRATION RATE: 16 BRPM | OXYGEN SATURATION: 99 % | WEIGHT: 207 LBS | TEMPERATURE: 98.5 F | HEIGHT: 63 IN | HEART RATE: 104 BPM

## 2023-11-13 DIAGNOSIS — M53.3 SACROILIAC JOINT PAIN: Primary | ICD-10-CM

## 2023-11-13 LAB
APPEARANCE FLUID: CLEAR
BILIRUBIN, POC: NORMAL
BLOOD URINE, POC: NORMAL
CLARITY, POC: NORMAL
COLOR, POC: YELLOW
GLUCOSE URINE, POC: NORMAL
KETONES, POC: NORMAL
LEUKOCYTE EST, POC: NORMAL
NITRITE, POC: NORMAL
PH, POC: 5.5
PROTEIN, POC: NORMAL
SPECIFIC GRAVITY, POC: 1.03
UROBILINOGEN, POC: 0.2

## 2023-11-13 PROCEDURE — 99213 OFFICE O/P EST LOW 20 MIN: CPT | Performed by: FAMILY MEDICINE

## 2023-11-13 PROCEDURE — 1036F TOBACCO NON-USER: CPT | Performed by: FAMILY MEDICINE

## 2023-11-13 PROCEDURE — 81002 URINALYSIS NONAUTO W/O SCOPE: CPT | Performed by: FAMILY MEDICINE

## 2023-11-13 PROCEDURE — G8427 DOCREV CUR MEDS BY ELIG CLIN: HCPCS | Performed by: FAMILY MEDICINE

## 2023-11-13 PROCEDURE — G8484 FLU IMMUNIZE NO ADMIN: HCPCS | Performed by: FAMILY MEDICINE

## 2023-11-13 PROCEDURE — G8417 CALC BMI ABV UP PARAM F/U: HCPCS | Performed by: FAMILY MEDICINE

## 2023-11-13 RX ORDER — KETOROLAC TROMETHAMINE 10 MG/1
10 TABLET, FILM COATED ORAL EVERY 6 HOURS PRN
Qty: 20 TABLET | Refills: 0 | Status: SHIPPED | OUTPATIENT
Start: 2023-11-13 | End: 2024-11-12

## 2023-11-13 RX ORDER — TRAZODONE HYDROCHLORIDE 50 MG/1
TABLET ORAL
COMMUNITY
Start: 2023-10-17

## 2023-11-13 RX ORDER — CYCLOBENZAPRINE HCL 5 MG
5 TABLET ORAL NIGHTLY PRN
Qty: 10 TABLET | Refills: 0 | Status: SHIPPED | OUTPATIENT
Start: 2023-11-13 | End: 2023-11-23

## 2023-11-13 RX ORDER — FAMOTIDINE 20 MG/1
20 TABLET, FILM COATED ORAL 2 TIMES DAILY
Qty: 60 TABLET | Refills: 11 | COMMUNITY
Start: 2023-06-27 | End: 2023-11-13 | Stop reason: ALTCHOICE

## 2023-11-13 RX ORDER — PREDNISONE 10 MG/1
TABLET ORAL
Qty: 21 TABLET | Refills: 0 | Status: SHIPPED | OUTPATIENT
Start: 2023-11-13

## 2023-11-13 ASSESSMENT — COLUMBIA-SUICIDE SEVERITY RATING SCALE - C-SSRS
3. HAVE YOU BEEN THINKING ABOUT HOW YOU MIGHT KILL YOURSELF?: NO
5. HAVE YOU STARTED TO WORK OUT OR WORKED OUT THE DETAILS OF HOW TO KILL YOURSELF? DO YOU INTEND TO CARRY OUT THIS PLAN?: NO
4. HAVE YOU HAD THESE THOUGHTS AND HAD SOME INTENTION OF ACTING ON THEM?: NO
7. DID THIS OCCUR IN THE LAST THREE MONTHS: NO

## 2023-11-13 ASSESSMENT — PATIENT HEALTH QUESTIONNAIRE - PHQ9
SUM OF ALL RESPONSES TO PHQ QUESTIONS 1-9: 0
5. POOR APPETITE OR OVEREATING: 0
SUM OF ALL RESPONSES TO PHQ QUESTIONS 1-9: 0
SUM OF ALL RESPONSES TO PHQ QUESTIONS 1-9: 0
3. TROUBLE FALLING OR STAYING ASLEEP: 0
2. FEELING DOWN, DEPRESSED OR HOPELESS: 0
4. FEELING TIRED OR HAVING LITTLE ENERGY: 0
9. THOUGHTS THAT YOU WOULD BE BETTER OFF DEAD, OR OF HURTING YOURSELF: 0
6. FEELING BAD ABOUT YOURSELF - OR THAT YOU ARE A FAILURE OR HAVE LET YOURSELF OR YOUR FAMILY DOWN: 0
1. LITTLE INTEREST OR PLEASURE IN DOING THINGS: 0
10. IF YOU CHECKED OFF ANY PROBLEMS, HOW DIFFICULT HAVE THESE PROBLEMS MADE IT FOR YOU TO DO YOUR WORK, TAKE CARE OF THINGS AT HOME, OR GET ALONG WITH OTHER PEOPLE: 0
SUM OF ALL RESPONSES TO PHQ QUESTIONS 1-9: 0
SUM OF ALL RESPONSES TO PHQ9 QUESTIONS 1 & 2: 0
8. MOVING OR SPEAKING SO SLOWLY THAT OTHER PEOPLE COULD HAVE NOTICED. OR THE OPPOSITE, BEING SO FIGETY OR RESTLESS THAT YOU HAVE BEEN MOVING AROUND A LOT MORE THAN USUAL: 0
7. TROUBLE CONCENTRATING ON THINGS, SUCH AS READING THE NEWSPAPER OR WATCHING TELEVISION: 0

## 2023-11-13 ASSESSMENT — ENCOUNTER SYMPTOMS
ABDOMINAL PAIN: 0
COUGH: 0
EYE DISCHARGE: 0
WHEEZING: 0
NAUSEA: 0
BACK PAIN: 1
COLOR CHANGE: 0
VOMITING: 0
DIARRHEA: 0

## 2023-11-13 NOTE — PROGRESS NOTES
SUBJECTIVE:    Patient ID: Jaden Hernandez is a 24 y.o. female. HPI:   Patient is seen today for complaints of lower back pain. She states that this started about a week ago. She states that she has not had any injury to this area. She denies any recent falls or trauma. She states that the pain is located in the left lower back on the lateral aspect. She states it does not radiate down her leg. She describes it as a deep ache but then occasionally will be sharp. She denies any numbness or tingling in her foot or leg. She states that she has not had any difficulty with urination. She denies any odor or burning with urination. She denies any fevers or chills. She states that she has tried taking ibuprofen over-the-counter and she states that she has been trying to do some stretching at home without much improvement in her symptoms. Past Medical History:   Diagnosis Date    ADHD (attention deficit hyperactivity disorder) 12/02/2022    Anxiety     Depression     PTSD (post-traumatic stress disorder)       Current Outpatient Medications on File Prior to Visit   Medication Sig Dispense Refill    traZODone (DESYREL) 50 MG tablet       propranolol (INDERAL) 10 MG tablet Take 1 tablet by mouth as needed      DULoxetine (CYMBALTA) 60 MG extended release capsule       amLODIPine (NORVASC) 2.5 MG tablet Take 1 tablet by mouth daily 90 tablet 3    ZAFEMY 150-35 MCG/24HR APPLY 1 PATCH TOPICALLY ONCE A WEEK AS DIRECTED      EPINEPHrine (EPIPEN 2-JUMANA) 0.3 MG/0.3ML SOAJ injection Inject 0.3 mLs into the muscle once for 1 dose Use as directed for allergic reaction 0.3 mL 0     No current facility-administered medications on file prior to visit. Allergies   Allergen Reactions    Penicillins Hives and Anaphylaxis       Review of Systems   Constitutional:  Negative for activity change, appetite change and fever. HENT:  Negative for congestion and nosebleeds. Eyes:  Negative for discharge.    Respiratory:

## 2024-02-08 ENCOUNTER — OFFICE VISIT (OUTPATIENT)
Dept: PRIMARY CARE CLINIC | Age: 24
End: 2024-02-08
Payer: MEDICAID

## 2024-02-08 ENCOUNTER — TELEPHONE (OUTPATIENT)
Dept: PRIMARY CARE CLINIC | Age: 24
End: 2024-02-08

## 2024-02-08 VITALS
HEIGHT: 63 IN | BODY MASS INDEX: 34.55 KG/M2 | OXYGEN SATURATION: 98 % | DIASTOLIC BLOOD PRESSURE: 74 MMHG | SYSTOLIC BLOOD PRESSURE: 128 MMHG | WEIGHT: 195 LBS | HEART RATE: 101 BPM | RESPIRATION RATE: 16 BRPM | TEMPERATURE: 97.1 F

## 2024-02-08 DIAGNOSIS — J02.9 SORE THROAT: ICD-10-CM

## 2024-02-08 DIAGNOSIS — J35.1 TONSILLAR ENLARGEMENT: ICD-10-CM

## 2024-02-08 DIAGNOSIS — J03.90 TONSILLITIS: Primary | ICD-10-CM

## 2024-02-08 LAB — S PYO AG THROAT QL: NORMAL

## 2024-02-08 PROCEDURE — G8484 FLU IMMUNIZE NO ADMIN: HCPCS | Performed by: FAMILY MEDICINE

## 2024-02-08 PROCEDURE — G8427 DOCREV CUR MEDS BY ELIG CLIN: HCPCS | Performed by: FAMILY MEDICINE

## 2024-02-08 PROCEDURE — 1036F TOBACCO NON-USER: CPT | Performed by: FAMILY MEDICINE

## 2024-02-08 PROCEDURE — 99213 OFFICE O/P EST LOW 20 MIN: CPT | Performed by: FAMILY MEDICINE

## 2024-02-08 PROCEDURE — G8417 CALC BMI ABV UP PARAM F/U: HCPCS | Performed by: FAMILY MEDICINE

## 2024-02-08 RX ORDER — CEFDINIR 300 MG/1
300 CAPSULE ORAL 2 TIMES DAILY
Qty: 14 CAPSULE | Refills: 0 | Status: SHIPPED | OUTPATIENT
Start: 2024-02-08 | End: 2024-02-15

## 2024-02-08 RX ORDER — AZITHROMYCIN 250 MG/1
250 TABLET, FILM COATED ORAL SEE ADMIN INSTRUCTIONS
Qty: 6 TABLET | Refills: 0 | Status: SHIPPED | OUTPATIENT
Start: 2024-02-08 | End: 2024-02-13

## 2024-02-08 ASSESSMENT — PATIENT HEALTH QUESTIONNAIRE - PHQ9
5. POOR APPETITE OR OVEREATING: SEVERAL DAYS
SUM OF ALL RESPONSES TO PHQ9 QUESTIONS 1 & 2: 0
3. TROUBLE FALLING OR STAYING ASLEEP: SEVERAL DAYS
8. MOVING OR SPEAKING SO SLOWLY THAT OTHER PEOPLE COULD HAVE NOTICED. OR THE OPPOSITE, BEING SO FIGETY OR RESTLESS THAT YOU HAVE BEEN MOVING AROUND A LOT MORE THAN USUAL: 1
9. THOUGHTS THAT YOU WOULD BE BETTER OFF DEAD, OR OF HURTING YOURSELF: 0
7. TROUBLE CONCENTRATING ON THINGS, SUCH AS READING THE NEWSPAPER OR WATCHING TELEVISION: 1
SUM OF ALL RESPONSES TO PHQ QUESTIONS 1-9: 5
10. IF YOU CHECKED OFF ANY PROBLEMS, HOW DIFFICULT HAVE THESE PROBLEMS MADE IT FOR YOU TO DO YOUR WORK, TAKE CARE OF THINGS AT HOME, OR GET ALONG WITH OTHER PEOPLE: SOMEWHAT DIFFICULT
4. FEELING TIRED OR HAVING LITTLE ENERGY: 1
9. THOUGHTS THAT YOU WOULD BE BETTER OFF DEAD, OR OF HURTING YOURSELF: NOT AT ALL
SUM OF ALL RESPONSES TO PHQ QUESTIONS 1-9: 5
3. TROUBLE FALLING OR STAYING ASLEEP: 1
6. FEELING BAD ABOUT YOURSELF - OR THAT YOU ARE A FAILURE OR HAVE LET YOURSELF OR YOUR FAMILY DOWN: 0
2. FEELING DOWN, DEPRESSED OR HOPELESS: NOT AT ALL
SUM OF ALL RESPONSES TO PHQ QUESTIONS 1-9: 5
1. LITTLE INTEREST OR PLEASURE IN DOING THINGS: 0
2. FEELING DOWN, DEPRESSED OR HOPELESS: 0
8. MOVING OR SPEAKING SO SLOWLY THAT OTHER PEOPLE COULD HAVE NOTICED. OR THE OPPOSITE - BEING SO FIDGETY OR RESTLESS THAT YOU HAVE BEEN MOVING AROUND A LOT MORE THAN USUAL: SEVERAL DAYS
4. FEELING TIRED OR HAVING LITTLE ENERGY: SEVERAL DAYS
SUM OF ALL RESPONSES TO PHQ QUESTIONS 1-9: 5
10. IF YOU CHECKED OFF ANY PROBLEMS, HOW DIFFICULT HAVE THESE PROBLEMS MADE IT FOR YOU TO DO YOUR WORK, TAKE CARE OF THINGS AT HOME, OR GET ALONG WITH OTHER PEOPLE: 1
SUM OF ALL RESPONSES TO PHQ QUESTIONS 1-9: 5
6. FEELING BAD ABOUT YOURSELF - OR THAT YOU ARE A FAILURE OR HAVE LET YOURSELF OR YOUR FAMILY DOWN: NOT AT ALL
1. LITTLE INTEREST OR PLEASURE IN DOING THINGS: NOT AT ALL
7. TROUBLE CONCENTRATING ON THINGS, SUCH AS READING THE NEWSPAPER OR WATCHING TELEVISION: SEVERAL DAYS
5. POOR APPETITE OR OVEREATING: 1

## 2024-02-08 ASSESSMENT — ENCOUNTER SYMPTOMS
EYE REDNESS: 0
VOMITING: 0
EYE DISCHARGE: 0
DIARRHEA: 0
SORE THROAT: 1
SINUS PRESSURE: 1
ABDOMINAL PAIN: 0
COLOR CHANGE: 0
COUGH: 1
EYE ITCHING: 0
RHINORRHEA: 1
CHEST TIGHTNESS: 0
NAUSEA: 0
WHEEZING: 0

## 2024-02-08 NOTE — TELEPHONE ENCOUNTER
Pharmacy states Pt states she is allergic to PCN - Anaphylaxis. Not sure if she should take the Omnicef?

## 2024-02-08 NOTE — PROGRESS NOTES
SUBJECTIVE:    Patient ID: Tran Thompson is a 24 y.o. female.    HPI:   Patient is seen today for complaints of a swollen tonsil.  She states that she is also having pain in that left tonsil.  She states that it has been that way for the last few days.  She had flu over the weekend because her sister was positive.  She was not tested.  She states that she has had a lot of congestion and drainage since she had the flu.  She states that she was told when she was younger she need to have her tonsils out but she never had them taken out.  She states that she did run fever at the beginning of the week but that has cleared up.  She is having some trouble swallowing because of the pain of her tonsil.  She states that she has not been on any antibiotics recently.    Past Medical History:   Diagnosis Date    ADHD (attention deficit hyperactivity disorder) 12/02/2022    Anxiety     Depression     PTSD (post-traumatic stress disorder)       Current Outpatient Medications on File Prior to Visit   Medication Sig Dispense Refill    traZODone (DESYREL) 50 MG tablet       propranolol (INDERAL) 10 MG tablet Take 1 tablet by mouth as needed      DULoxetine (CYMBALTA) 60 MG extended release capsule       amLODIPine (NORVASC) 2.5 MG tablet Take 1 tablet by mouth daily 90 tablet 3    ZAFEMY 150-35 MCG/24HR APPLY 1 PATCH TOPICALLY ONCE A WEEK AS DIRECTED      predniSONE (DELTASONE) 10 MG tablet Take 6 tablets on day 1, 5 tablets on day 2, 4 tablets on day 3, 3 tablets on day 4, 2 tablets on day 5 and 1 tablet on day 6 (Patient not taking: Reported on 2/8/2024) 21 tablet 0    ketorolac (TORADOL) 10 MG tablet Take 1 tablet by mouth every 6 hours as needed for Pain (Patient not taking: Reported on 2/8/2024) 20 tablet 0    EPINEPHrine (EPIPEN 2-JUMANA) 0.3 MG/0.3ML SOAJ injection Inject 0.3 mLs into the muscle once for 1 dose Use as directed for allergic reaction 0.3 mL 0     No current facility-administered medications on file prior to

## 2024-02-08 NOTE — TELEPHONE ENCOUNTER
Can you call the patient and find out if she is ever taken a cephalosporin previously such as Keflex, Omnicef, Ceftin, etc.?